# Patient Record
Sex: FEMALE | Race: BLACK OR AFRICAN AMERICAN | NOT HISPANIC OR LATINO | Employment: UNEMPLOYED | URBAN - METROPOLITAN AREA
[De-identification: names, ages, dates, MRNs, and addresses within clinical notes are randomized per-mention and may not be internally consistent; named-entity substitution may affect disease eponyms.]

---

## 2017-01-04 ENCOUNTER — TRANSCRIBE ORDERS (OUTPATIENT)
Dept: ADMINISTRATIVE | Facility: HOSPITAL | Age: 52
End: 2017-01-04

## 2017-01-04 DIAGNOSIS — R91.1 COIN LESION: Primary | ICD-10-CM

## 2017-01-12 ENCOUNTER — HOSPITAL ENCOUNTER (OUTPATIENT)
Dept: CT IMAGING | Facility: HOSPITAL | Age: 52
Discharge: HOME | End: 2017-01-12
Payer: COMMERCIAL

## 2017-01-12 DIAGNOSIS — R91.1 SOLITARY PULMONARY NODULE: ICD-10-CM

## 2017-01-12 PROCEDURE — 71250 CT THORAX DX C-: CPT

## 2017-01-13 ENCOUNTER — GENERIC CONVERSION - ENCOUNTER (OUTPATIENT)
Dept: OTHER | Facility: OTHER | Age: 52
End: 2017-01-13

## 2017-03-16 ENCOUNTER — ALLSCRIPTS OFFICE VISIT (OUTPATIENT)
Dept: OTHER | Facility: OTHER | Age: 52
End: 2017-03-16

## 2017-04-04 ENCOUNTER — ALLSCRIPTS OFFICE VISIT (OUTPATIENT)
Dept: OTHER | Facility: OTHER | Age: 52
End: 2017-04-04

## 2017-06-12 ENCOUNTER — ALLSCRIPTS OFFICE VISIT (OUTPATIENT)
Dept: OTHER | Facility: OTHER | Age: 52
End: 2017-06-12

## 2017-06-20 ENCOUNTER — ALLSCRIPTS OFFICE VISIT (OUTPATIENT)
Dept: OTHER | Facility: OTHER | Age: 52
End: 2017-06-20

## 2017-07-10 ENCOUNTER — ALLSCRIPTS OFFICE VISIT (OUTPATIENT)
Dept: OTHER | Facility: OTHER | Age: 52
End: 2017-07-10

## 2017-08-24 ENCOUNTER — ALLSCRIPTS OFFICE VISIT (OUTPATIENT)
Dept: OTHER | Facility: OTHER | Age: 52
End: 2017-08-24

## 2017-09-13 ENCOUNTER — ALLSCRIPTS OFFICE VISIT (OUTPATIENT)
Dept: OTHER | Facility: OTHER | Age: 52
End: 2017-09-13

## 2017-09-14 ENCOUNTER — GENERIC CONVERSION - ENCOUNTER (OUTPATIENT)
Dept: OTHER | Facility: OTHER | Age: 52
End: 2017-09-14

## 2017-09-27 ENCOUNTER — ALLSCRIPTS OFFICE VISIT (OUTPATIENT)
Dept: OTHER | Facility: OTHER | Age: 52
End: 2017-09-27

## 2017-10-02 ENCOUNTER — ALLSCRIPTS OFFICE VISIT (OUTPATIENT)
Dept: OTHER | Facility: OTHER | Age: 52
End: 2017-10-02

## 2017-10-02 DIAGNOSIS — F41.9 ANXIETY DISORDER: ICD-10-CM

## 2017-10-02 DIAGNOSIS — M79.7 FIBROMYALGIA: ICD-10-CM

## 2017-10-02 DIAGNOSIS — E55.9 VITAMIN D DEFICIENCY: ICD-10-CM

## 2017-10-02 DIAGNOSIS — F33.9 RECURRENT MAJOR DEPRESSIVE DISORDER (HCC): ICD-10-CM

## 2017-10-02 DIAGNOSIS — R53.83 OTHER FATIGUE: ICD-10-CM

## 2017-10-04 ENCOUNTER — LAB CONVERSION - ENCOUNTER (OUTPATIENT)
Dept: OTHER | Facility: OTHER | Age: 52
End: 2017-10-04

## 2017-10-04 ENCOUNTER — GENERIC CONVERSION - ENCOUNTER (OUTPATIENT)
Dept: OTHER | Facility: OTHER | Age: 52
End: 2017-10-04

## 2017-10-04 LAB
25(OH)D3 SERPL-MCNC: 19 NG/ML (ref 30–100)
A/G RATIO (HISTORICAL): 1.8 (CALC) (ref 1–2.5)
ALBUMIN SERPL BCP-MCNC: 4.4 G/DL (ref 3.6–5.1)
ALP SERPL-CCNC: 70 U/L (ref 40–115)
ALT SERPL W P-5'-P-CCNC: 11 U/L (ref 9–46)
AST SERPL W P-5'-P-CCNC: 14 U/L (ref 10–35)
BASOPHILS # BLD AUTO: 0.4 %
BASOPHILS # BLD AUTO: 19 CELLS/UL (ref 0–200)
BILIRUB SERPL-MCNC: 0.6 MG/DL (ref 0.2–1.2)
BUN SERPL-MCNC: 11 MG/DL (ref 7–25)
BUN/CREA RATIO (HISTORICAL): NORMAL (CALC) (ref 6–22)
CALCIUM SERPL-MCNC: 9.1 MG/DL (ref 8.6–10.3)
CHLORIDE SERPL-SCNC: 101 MMOL/L (ref 98–110)
CO2 SERPL-SCNC: 29 MMOL/L (ref 20–31)
CREAT SERPL-MCNC: 0.74 MG/DL (ref 0.7–1.33)
DEPRECATED RDW RBC AUTO: 12.7 % (ref 11–15)
EGFR AFRICAN AMERICAN (HISTORICAL): 125 ML/MIN/1.73M2
EGFR-AMERICAN CALC (HISTORICAL): 108 ML/MIN/1.73M2
EOSINOPHIL # BLD AUTO: 1 %
EOSINOPHIL # BLD AUTO: 48 CELLS/UL (ref 15–500)
GAMMA GLOBULIN (HISTORICAL): 2.4 G/DL (CALC) (ref 1.9–3.7)
GLUCOSE (HISTORICAL): 91 MG/DL (ref 65–99)
HCT VFR BLD AUTO: 35.4 % (ref 38.5–50)
HGB BLD-MCNC: 11.7 G/DL (ref 13.2–17.1)
LYMPHOCYTES # BLD AUTO: 1570 CELLS/UL (ref 850–3900)
LYMPHOCYTES # BLD AUTO: 32.7 %
MCH RBC QN AUTO: 27.2 PG (ref 27–33)
MCHC RBC AUTO-ENTMCNC: 33.1 G/DL (ref 32–36)
MCV RBC AUTO: 82.3 FL (ref 80–100)
MONOCYTES # BLD AUTO: 336 CELLS/UL (ref 200–950)
MONOCYTES (HISTORICAL): 7 %
NEUTROPHILS # BLD AUTO: 2827 CELLS/UL (ref 1500–7800)
NEUTROPHILS # BLD AUTO: 58.9 %
PLATELET # BLD AUTO: 272 THOUSAND/UL (ref 140–400)
PMV BLD AUTO: 9.4 FL (ref 7.5–12.5)
POTASSIUM SERPL-SCNC: 3.5 MMOL/L (ref 3.5–5.3)
RBC # BLD AUTO: 4.3 MILLION/UL (ref 4.2–5.8)
SODIUM SERPL-SCNC: 139 MMOL/L (ref 135–146)
TOTAL PROTEIN (HISTORICAL): 6.8 G/DL (ref 6.1–8.1)
TSH SERPL DL<=0.05 MIU/L-ACNC: 2.04 MIU/L (ref 0.4–4.5)
WBC # BLD AUTO: 4.8 THOUSAND/UL (ref 3.8–10.8)

## 2017-10-06 ENCOUNTER — GENERIC CONVERSION - ENCOUNTER (OUTPATIENT)
Dept: OTHER | Facility: OTHER | Age: 52
End: 2017-10-06
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
## 2017-11-01 ENCOUNTER — GENERIC CONVERSION - ENCOUNTER (OUTPATIENT)
Dept: OTHER | Facility: OTHER | Age: 52
End: 2017-11-01

## 2017-11-06 ENCOUNTER — ALLSCRIPTS OFFICE VISIT (OUTPATIENT)
Dept: OTHER | Facility: OTHER | Age: 52
End: 2017-11-06

## 2017-11-13 ENCOUNTER — ALLSCRIPTS OFFICE VISIT (OUTPATIENT)
Dept: OTHER | Facility: OTHER | Age: 52
End: 2017-11-13

## 2017-12-06 ENCOUNTER — GENERIC CONVERSION - ENCOUNTER (OUTPATIENT)
Dept: FAMILY MEDICINE CLINIC | Facility: CLINIC | Age: 52
End: 2017-12-06

## 2017-12-12 ENCOUNTER — ALLSCRIPTS OFFICE VISIT (OUTPATIENT)
Dept: OTHER | Facility: OTHER | Age: 52
End: 2017-12-12
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
## 2018-01-09 NOTE — PSYCH
Treatment Plan Tracking    #1 Treatment Plan not completed within required time limits due to: Client presented with emotional/behavioral issues that required clinical intervention            Signatures   Electronically signed by : REMIGIO Reyes,ROLAND; Aug 17 2016  8:43PM EST                       (Author)
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
## 2018-01-10 NOTE — PSYCH
Progress Note  Psychotherapy Provided [facility]: Individual Psychotherapy 45 mins  minutes provided today  Goals addressed in session:   (G# 1) states her plans to visit with her family in [place] on 10/16/17; "I am not engaging in any arguments or nonsense  "I have been keeping myself busy" with her babysitting her granddaughter;  has met with her  regarding her marital issues and had her mother accompany her to this appointment; "I have no problem expressing myself  My problems is the timing " She shared much information about her most recent interactions with her 29 yr old daughter and her mother with the identified focus being Munira beginning to establish clearer boundaries with her family including her no longer tolerating "drama" and disrespect  discussed her diligent efforts to adjust her expectations of herself in her relationships with her family; A: While presents as emotionally exhausted from her efforts to redefine her relationships with her family, she noted her feeling validated and as improving in her self-confidence; She expressed disappointment in her report of her mother's lack of response ("It has been two months ") to Munira's request to stay (at least) temporarily with her mother if she decides to leave her   P: (G#1 ) will continue to assert herself with her family;   Pain Scale and Suicide Risk [facility]: On a scale of 0 to 10, the patient rates current pain at 6   Current suicide risk is low   Behavioral Health Treatment Plan [address] [place] [address] [address]: Diagnosis and Treatment Plan explained to patient, patient relates understanding diagnosis and is agreeable to Treatment Plan  Results/Data  PHQ-9 Adult Depression Screening [id number] 01:33PM Marley Paul     Test Name Result Flag Reference   PHQ-9 Adult Depression Score 13     Over the last two weeks, how often have you been bothered by any of the following problems?   Little interest or pleasure in doing things: More than half the days - 2  Feeling down, depressed, or hopeless: Several days - 1  Trouble falling or staying asleep, or sleeping too much: More than half the days - 2  Feeling tired or having little energy: More than half the days - 2  Poor appetite or over eating: More than half the days - 2  Feeling bad about yourself - or that you are a failure or have let yourself or your family down: More than half the days - 2  Trouble concentrating on things, such as reading the newspaper or watching television: Several days - 1  Moving or speaking so slowly that other people could have noticed  Or the opposite -  being so fidgety or restless that you have been moving around a lot more than usual: Several days - 1  Thoughts that you would be better off dead, or of hurting yourself in some way: Not at all - 0   PHQ-9 Adult Depression Screening Positive     PHQ-9 Difficulty Level Not difficult at all     PHQ-9 Severity Moderate Depression         Assessment    1  Anxiety (300 00) (F41 9)   2  Major depressive disorder, recurrent episode, moderate (296 32) (F33 1)   3   Panic disorder without agoraphobia with mild panic attacks (300 01) (F41 0)    Signatures   Electronically signed by : Candy Hoffman, MSWLCSWMSAMADEO,ROLAND; Sep 27 2017  3:10PM EST                       (Author)
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
## 2018-01-10 NOTE — PSYCH
Message  Message Free Text Note Form: She left messagetoday at 7:51 AM to cancel her ind  psych  appt  for tonight at 7 PM reporting her 's truck broke down and she has no transportation to the appt  Active Problems    1  Allergic rhinitis (477 9) (J30 9)   2  Anxiety (300 00) (F41 9)   3  Chest pain (786 50) (R07 9)   4  Costochondritis (733 6) (M94 0)   5  Depression with anxiety (300 4) (F41 8)   6  Dry mouth (527 7) (R68 2)   7  Dyspnea (786 09) (R06 00)   8  Encounter for screening for malignant neoplasm of cervix (V76 2) (Z12 4)   9  Fatigue (780 79) (R53 83)   10  Fibromyalgia (729 1) (M79 7)   11  Flu vaccine need (V04 81) (Z23)   12  Insomnia (780 52) (G47 00)   13  Leg pain, right (729 5) (M79 604)   14  Major depressive disorder, recurrent episode, moderate (296 32) (F33 1)   15  Migraine headache (346 90) (G43 909)   16  Panic Disorder Without Agoraphobia   17  Vitamin D deficiency (268 9) (E55 9)    Current Meds   1  ClonazePAM 0 5 MG Oral Tablet; TAKE 1 TABLET TWICE DAILY; Therapy: 85HJG7734 to (Evaluate:23Oct2016); Last Rx:06Lnx7783 Ordered   2  Cyclobenzaprine HCl - 10 MG Oral Tablet; take 1 tablet daily as needed Recorded   3  Cymbalta 30 MG Oral Capsule Delayed Release Particles (DULoxetine HCl); TAKE ONE   (1) CAPSULE DAILY; Therapy: 66OMD1414 to Recorded   4  Cymbalta 60 MG Oral Capsule Delayed Release Particles (DULoxetine HCl); TAKE 1   CAPSULE DAILY; Therapy: (Recorded:99Nrb8746) to Recorded   5  Estradiol 2 MG Oral Tablet; qd Recorded   6  Fluticasone Propionate 50 MCG/ACT Nasal Suspension; USE 2 SPRAYS IN EACH   NOSTRIL ONCE DAILY; Therapy: 55FYO2914 to (Evaluate:32Bpz2801)  Requested for: 11Aug2014; Last   Rx:11Aug2014 Ordered   7  Gabapentin 100 MG Oral Capsule; TAKE 1 CAPSULE 3 TIMES DAILY  start with one tab at   bedtime for 5 days then 1 in am and 1 at bedtime for 5 days then 3 times   a day afterthat; Therapy: (Recorded:14Jun2016) to Recorded   8   LORazepam 1 MG Oral Tablet (Ativan); TAKE 1 TABLET 3 TIMES DAILY AS NEEDED; Therapy: 58MZP1861 to (Evaluate:05Ywi3904)  Requested for: 32QPX2811; Last   Rx:83Pqi2308 Ordered   9  Motrin 400 MG TABS (Ibuprofen); Therapy: (Recorded:40Lup4243) to Recorded   10  ProAir  (90 Base) MCG/ACT Inhalation Aerosol Solution; INHALE 2 PUFFS    EVERY 6 HOURS AS NEEDED; Therapy: 02Apr2015 to (Evaluate:16Apr2015)  Requested for: 02Apr2015; Last    Rx:02Apr2015 Ordered   11  ZyrTEC Allergy 10 MG Oral Tablet; take 1 tablet daily as needed Recorded    Allergies    1   No Known Drug Allergies    Signatures   Electronically signed by : Niki Laguna, MSWLCSWMSAMADEO,RASHIW; Nov 2 2016  9:38AM EST                       (Author)

## 2018-01-10 NOTE — RESULT NOTES
Verified Results  (1) COMPREHENSIVE METABOLIC PANEL 21UGV8347 96:00SE [employer]     Test Name Result Flag Reference   GLUCOSE 91 mg/dL  65-99   Fasting reference interval   UREA NITROGEN (BUN) 11 mg/dL  7-25   CREATININE 0 74 mg/dL  0 70-1 33   For patients >52years of age, the reference limit  for Creatinine is approximately 13% higher for people  identified as -American  eGFR NON-AFR   AMERICAN 108 mL/min/1 73m2  > OR = 60   eGFR AFRICAN AMERICAN 125 mL/min/1 73m2  > OR = 60   BUN/CREATININE RATIO   0-55   NOT APPLICABLE (calc)   SODIUM 139 mmol/L  135-146   POTASSIUM 3 5 mmol/L  3 5-5 3   CHLORIDE 101 mmol/L     CARBON DIOXIDE 29 mmol/L  20-31   CALCIUM 9 1 mg/dL  8 6-10 3   PROTEIN, TOTAL 6 8 g/dL  6 1-8 1   ALBUMIN 4 4 g/dL  3 6-5 1   GLOBULIN 2 4 g/dL (calc)  1 9-3 7   ALBUMIN/GLOBULIN RATIO 1 8 (calc)  1 0-2 5   BILIRUBIN, TOTAL 0 6 mg/dL  0 2-1 2   ALKALINE PHOSPHATASE 70 U/L     AST 14 U/L  10-35   ALT 11 U/L  9-46     (1) CBC/PLT/DIFF 52YLD2934 12:10PM [employer]     Test Name Result Flag Reference   WHITE BLOOD CELL COUNT 4 8 Thousand/uL  3 8-10 8   RED BLOOD CELL COUNT 4 30 Million/uL  4 20-5 80   HEMOGLOBIN 11 7 g/dL L 13 2-17 1   HEMATOCRIT 35 4 % L 38 5-50 0   MCV 82 3 fL  80 0-100 0   MCH 27 2 pg  27 0-33 0   MCHC 33 1 g/dL  32 0-36 0   RDW 12 7 %  11 0-15 0   PLATELET COUNT 342 Thousand/uL  140-400   ABSOLUTE NEUTROPHILS 2827 cells/uL  6586-5269   ABSOLUTE LYMPHOCYTES 1570 cells/uL  850-3900   ABSOLUTE MONOCYTES 336 cells/uL  200-950   ABSOLUTE EOSINOPHILS 48 cells/uL     ABSOLUTE BASOPHILS 19 cells/uL  0-200   NEUTROPHILS 58 9 %     LYMPHOCYTES 32 7 %     MONOCYTES 7 0 %     EOSINOPHILS 1 0 %     BASOPHILS 0 4 %     MPV 9 4 fL  7 5-12 5     *(Q) VITAMIN D, 25-HYDROXY, LC/MS/MS 67CXS2041 12:10PM [employer]     Test Name Result Flag Reference   VITAMIN D, 25-OH, TOTAL 19 ng/mL L    Vitamin D Status         25-OH Vitamin D:     Deficiency: <20 ng/mL  Insufficiency:             20 - 29 ng/mL  Optimal:                 > or = 30 ng/mL     For 25-OH Vitamin D testing on patients on   D2-supplementation and patients for whom quantitation   of D2 and D3 fractions is required, the QuestAssureD(TM)  25-OH VIT D, (D2,D3), LC/MS/MS is recommended: order   code 58551 (patients >2yrs)  For more information on this test, go to:  http://[employer]/faq/HSX042  (This link is being provided for   informational/educational purposes only )     (Q) TSH, 3RD GENERATION W/REFLEX TO FT4 95CKY6150 12:10PM Ellie Cotton   REPORT COMMENT:  FASTING:YES     Test Name Result Flag Reference   TSH W/REFLEX TO FT4 2 04 mIU/L  0 40-4 50

## 2018-01-11 NOTE — PSYCH
Message  Message Free Text Note Form: hao at 10: 36 AM today to cancel her 2 PM appt  for tomorrow reporting is still out of town; Active Problems    1  Allergic rhinitis (477 9) (J30 9)   2  Anxiety (300 00) (F41 9)   3  Costochondritis (733 6) (M94 0)   4  Dry mouth (527 7) (R68 2)   5  Encounter for screening for malignant neoplasm of cervix (V76 2) (Z12 4)   6  Fatigue (780 79) (R53 83)   7  Fibromyalgia (729 1) (M79 7)   8  Flu vaccine need (V04 81) (Z23)   9  Insomnia (780 52) (G47 00)   10  Major depressive disorder, recurrent (296 30) (F33 9)   11  Major depressive disorder, recurrent episode, moderate (296 32) (F33 1)   12  Migraine headache (346 90) (G43 909)   13  Panic disorder without agoraphobia with mild panic attacks (300 01) (F41 0)   14  Pulmonary nodule (793 11) (R91 1)   15  Sacroiliitis (720 2) (M46 1)   16  Sciatica of left side (724 3) (M54 32)   17  Vitamin D deficiency (268 9) (E55 9)    Current Meds   1  Acyclovir 800 MG Oral Tablet; TAKE 1 TABLET TWICE DAILY; Therapy: (Recorded:02Oct2017) to Recorded   2  BuPROPion HCl ER (XL) 150 MG Oral Tablet Extended Release 24 Hour; TAKE 1   TABLET DAILY; Therapy: 03TEB8130 to (Audrey Hanley)  Requested for: 74PTA5375; Last   Rx:02Oct2017 Ordered   3  ClonazePAM 0 5 MG Oral Tablet; Take 1 tablet twice daily as needed; Therapy: 11UGJ3409 to (Evaluate:01Nov2017); Last Rx:02Oct2017 Ordered   4  Colon Cleanse CAPS; take 1 capsule daily; Therapy: [phone]) to Recorded   5  Cyclobenzaprine HCl - 10 MG Oral Tablet; take 1 tablet daily as needed Recorded   6  Cymbalta 60 MG Oral Capsule Delayed Release Particles (DULoxetine HCl); TAKE 1   CAPSULE DAILY; Therapy: (Recorded:17Qer6268) to Recorded   7  Estradiol 2 MG Oral Tablet; qd Recorded   8  Fluticasone Propionate 50 MCG/ACT Nasal Suspension; USE 2 SPRAYS IN EACH   NOSTRIL ONCE DAILY; Therapy: 37NPL6007 to (Evaluate:89Pfw6540)  Requested for: 11Aug2014;  Last   Rx:11Aug2014 Ordered   9  Gabapentin 100 MG Oral Capsule; TAKE 1 CAPSULE in the morning, 1 capsule in the   afternoon, and 3 capsules at bedtime; Therapy: [phone]) to Recorded   10  Ibuprofen 200 MG Oral Tablet; TAKE 2 TABLETS 3 TIMES DAILY; Therapy: [phone]) to Recorded   11  Nabumetone 750 MG Oral Tablet; TAKE 1 TABLET TWICE DAILY AS NEEDED; Therapy: 68BLV4325 to (Evaluate:26Uov1884)  Requested for: 35Ahe6757; Last    Rx:05Xjn6448 Ordered   12  ProAir  (90 Base) MCG/ACT Inhalation Aerosol Solution; INHALE 2 PUFFS    EVERY 6 HOURS AS NEEDED; Therapy: 99Dti4268 to (Evaluate:76Uoo3858)  Requested for: 47Lve3316; Last    Rx:13Geq9547 Ordered   13  Vitamin D (Ergocalciferol) 87272 UNIT Oral Capsule; TAKE 1 CAPSULE WEEKLY; Therapy: 41DYN1844 to (Last Rx:79Ajn8625)  Requested for: 89Vdh0211 Ordered   14  Vitamin D 2000 UNIT Oral Capsule; TAKE 1 CAPSULE Daily after 12 weeks of 50,000    units; Therapy: 70DDO6925 to Recorded   15  ZyrTEC Allergy 10 MG Oral Tablet; take 1 tablet daily as needed Recorded    Allergies    1   No Known Drug Allergies    Signatures   Electronically signed by : MODESTA LoLCSWMODESTA,RASHIW; Nov 1 2017 11:25AM EST                       (Author)
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
## 2018-01-11 NOTE — PSYCH
Treatment Plan Tracking    #1 Treatment Plan not completed within required time limits due to: Other: did not schedule an appt  w ithin the 4 mon   time frame required to update a tx plan;           Signatures   Electronically signed by : REMIGIO Cruz,ROLAND; Mar 16 2017  3:50PM EST                       (Author)

## 2018-01-12 VITALS
BODY MASS INDEX: 25.83 KG/M2 | HEART RATE: 100 BPM | WEIGHT: 140.38 LBS | HEIGHT: 62 IN | SYSTOLIC BLOOD PRESSURE: 110 MMHG | DIASTOLIC BLOOD PRESSURE: 80 MMHG

## 2018-01-12 NOTE — PSYCH
Treatment Plan Tracking    #1 Treatment Plan not completed within required time limits due to: Other: at 5: 07 PM today left message to cancel her ind  psych  appt  for tonight at 7 PM; "too exhausted from moving;"            Signatures   Electronically signed by : Shreyas Schneider, ZULEMASWMODESTA,ROLAND; Oct  3 2016  5:48PM EST                       (Author)

## 2018-01-12 NOTE — PSYCH
Message  Message Free Text Note Form: requested a return phone call at which time she requested her clinical information to be released to her  whom she states is handling her Soc  Sec  Disability application; informed her the request for her medical records will need to be submitted to this practice by her  on her 's letterhead as well as her being required to sign a Release of Information (KYLE)  She committed to coming to the office on 8/28/17 with the address, etc  of her  for her to complete the KYLE; Active Problems    1  Allergic rhinitis (477 9) (J30 9)   2  Anxiety (300 00) (F41 9)   3  Costochondritis (733 6) (M94 0)   4  Dry mouth (527 7) (R68 2)   5  Encounter for screening for malignant neoplasm of cervix (V76 2) (Z12 4)   6  Fatigue (780 79) (R53 83)   7  Fibromyalgia (729 1) (M79 7)   8  Flu vaccine need (V04 81) (Z23)   9  Insomnia (780 52) (G47 00)   10  Major depressive disorder, recurrent episode, moderate (296 32) (F33 1)   11  Migraine headache (346 90) (G43 909)   12  Panic disorder without agoraphobia with mild panic attacks (300 01) (F41 0)   13  Pulmonary nodule (793 11) (R91 1)   14  Sacroiliitis (720 2) (M46 1)   15  Sciatica of left side (724 3) (M54 32)   16  Vitamin D deficiency (268 9) (E55 9)    Current Meds   1  ClonazePAM 0 5 MG Oral Tablet; Take 1 tablet twice daily as needed; Therapy: 56GJB5442 to (Evaluate:39Wyi1774); Last Rx:38Aqd9408 Ordered   2  Cyclobenzaprine HCl - 10 MG Oral Tablet; take 1 tablet daily as needed Recorded   3  Cymbalta 30 MG Oral Capsule Delayed Release Particles (DULoxetine HCl); TAKE ONE   (1) CAPSULE DAILY; Therapy: 09XNM7421 to Recorded   4  Cymbalta 60 MG Oral Capsule Delayed Release Particles (DULoxetine HCl); TAKE 1   CAPSULE DAILY; Therapy: (Recorded:45Kxr3978) to Recorded   5  Estradiol 2 MG Oral Tablet; qd Recorded   6   Fluticasone Propionate 50 MCG/ACT Nasal Suspension; USE 2 SPRAYS IN EACH   NOSTRIL ONCE DAILY; Therapy: 08ACM7765 to (Evaluate:52Ojz5518)  Requested for: 11Aug2014; Last   Rx:11Aug2014 Ordered   7  Gabapentin 100 MG Oral Capsule; TAKE 1 CAPSULE AT BEDTIME; Therapy: (Recorded:55Xdd4137) to Recorded   8  Nabumetone 750 MG Oral Tablet; TAKE 1 TABLET TWICE DAILY AS NEEDED; Therapy: 81CEG4888 to (Evaluate:48Ohy4869)  Requested for: 08Aug2017; Last   Rx:08Aug2017 Ordered   9  ProAir  (90 Base) MCG/ACT Inhalation Aerosol Solution; INHALE 2 PUFFS   EVERY 6 HOURS AS NEEDED; Therapy: 02Apr2015 to (Evaluate:16Apr2015)  Requested for: 02Apr2015; Last   Rx:02Apr2015 Ordered   10  ZyrTEC Allergy 10 MG Oral Tablet; take 1 tablet daily as needed Recorded    Allergies    1   No Known Drug Allergies    Signatures   Electronically signed by : Eleuterio Romeo, MODESTALCSWMSAMADEO,RASHIW; Aug 24 2017  5:13PM EST                       (Author)

## 2018-01-12 NOTE — PSYCH
Progress Note  Psychotherapy Provided [facility]: Individual Psychotherapy 45 mins  minutes provided today  Goals addressed in session:   (G# 1) "I have been depressed, a lot of crying  I have been sleeping in my grandchildren's bedroom " She clarified reportedly more recent conflicts with her current  and her feeling increasingly dissatisfied in the relationship  "I need to feel comfortable in my own home  I don't feel the love " She states has plans to visit with her sister in one of the Counts include [facility] beds at the [facility] during some time in September  discussed/reviewed the importance of her maintaining some self-care (ex , "I keep my distance from my  ") and exploring outlets for support; discussed the ongoing importance of effective communication when dealing with the reported conflicts between her and her ; A: presents as becoming increasingly dissatisfied in her marriage yet mindful of her having no income at this time; She confirmed she is applying for Soc  Sec  Disability  P: (G# 1) She will continue to use effective communication strategies and self-care;   Pain Scale and Suicide Risk [facility]: On a scale of 0 to 10, the patient rates current pain at 0   Current suicide risk is low   Behavioral Health Treatment Plan [facility]: Diagnosis and Treatment Plan explained to patient, patient relates understanding diagnosis and is agreeable to Treatment Plan  Assessment    1  Panic disorder without agoraphobia with mild panic attacks (300 01) (F41 0)   2   Major depressive disorder, recurrent episode, moderate (296 32) (F33 1)    Signatures   Electronically signed by : MODESTA MoodyLCSWMODESTA,ROLAND; Aug 24 2017  5:42PM EST                       (Author)

## 2018-01-13 NOTE — PSYCH
Progress Note  Psychotherapy Provided [facility]: Individual Psychotherapy 45 mins  minutes provided today  Goals addressed in session:   (G# 1 )"Things have been up and down   not as motivated   still going through things with my    attitude adjustments   " "His priorities are more important to him " "Because I do not work, if I know something (a bill) is due, I will wait to do something for myself  I will sacrifice  I don't ask for much " She shared multiple alleged experiences with plans to visit with family not coming to fruition  "I keep turning things down (opportunities to visit with her family who reside out of state)  " states has plans to visit with some family out of state from 6/22 -6/29; She elaborated on her tendency to "put others first and not do things for myself" which included several reported previous unsuccessful attempts to plan a family visit  She noted her two children and another relative purchased her round trip ticket to ensure she follows through with the visit  She expressed multiple concerns about her marriage (, Harjit Sinks, whom she reported had a previous marriage) and noted alleged efforts to have couples counseling with their  (She noted she is participating in "his" [facility] )  She described the experience as "biased" and expressed her impression that her  was unwilling to fully participate to accept his part of the responsibility for their marriage  She clarified another attempt to sit with him with her list of concerns (and to discuss his, also) which she stated did not prove to be productive   "What should I do?" discussed for preparation for the next session to further address her question that she consider listing what she is doing to effectively contribute to the marriage (handout) and what she feels her options are at this time; discussed self-care as integral to her quality of life including effective decision-making; A: presents as frustrated with her marriage; She has discussed the same concerns during previous sessions and her efforts to address her reported marital concerns  She described a key communication pattern as "he talks over me  I can never finish what I want to say " She added, "I get frustrated and yell back at him " P: (G# 1) will explore self-care strategies and complete the assignment noted during the previous portion of this documentation;   Pain Scale and Suicide Risk [facility]: On a scale of 0 to 10, the patient rates current pain at 0   Current suicide risk is low   Behavioral Health Treatment Plan [facility]: Diagnosis and Treatment Plan explained to patient, patient relates understanding diagnosis and is agreeable to Treatment Plan  Results/Data  PHQ-9 Adult Depression Screening 12Jun2017 12:19PM Tejinder Harden     Test Name Result Flag Reference   PHQ-9 Adult Depression Score 9     Over the last two weeks, how often have you been bothered by any of the following problems? Little interest or pleasure in doing things: Several days - 1  Feeling down, depressed, or hopeless: Several days - 1  Trouble falling or staying asleep, or sleeping too much: Nearly every day - 3  Feeling tired or having little energy: Several days - 1  Poor appetite or over eating: Several days - 1  Feeling bad about yourself - or that you are a failure or have let yourself or your family down: Several days - 1  Trouble concentrating on things, such as reading the newspaper or watching television: Several days - 1  Moving or speaking so slowly that other people could have noticed   Or the opposite -  being so fidgety or restless that you have been moving around a lot more than usual: Not at all - 0  Thoughts that you would be better off dead, or of hurting yourself in some way: Not at all - 0   PHQ-9 Adult Depression Screening Negative     PHQ-9 Difficulty Level Somewhat difficult     PHQ-9 Severity Mild Depression       PHQ-9 Adult Depression Screening [id number] 12:19PM Gissell Prater     Test Name Result Flag Reference   PHQ-9 Adult Depression Score 9     Over the last two weeks, how often have you been bothered by any of the following problems? Little interest or pleasure in doing things: Several days - 1  Feeling down, depressed, or hopeless: Several days - 1  Trouble falling or staying asleep, or sleeping too much: Nearly every day - 3  Feeling tired or having little energy: Several days - 1  Poor appetite or over eating: Several days - 1  Feeling bad about yourself - or that you are a failure or have let yourself or your family down: Several days - 1  Trouble concentrating on things, such as reading the newspaper or watching television: Several days - 1  Moving or speaking so slowly that other people could have noticed  Or the opposite -  being so fidgety or restless that you have been moving around a lot more than usual: Not at all - 0  Thoughts that you would be better off dead, or of hurting yourself in some way: Not at all - 0   PHQ-9 Adult Depression Screening Negative     PHQ-9 Difficulty Level Somewhat difficult     PHQ-9 Severity Mild Depression         Assessment    1  Anxiety (300 00) (F41 9)   2  Major depressive disorder, recurrent episode, moderate (296 32) (F33 1)   3   Panic disorder without agoraphobia with mild panic attacks (300 01) (F41 0)    Signatures   Electronically signed by : MODESTA LoLCSWMSAMADEO,ROLAND; Jun 12 2017  2:22PM EST                       (Author)
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
## 2018-01-14 VITALS
SYSTOLIC BLOOD PRESSURE: 92 MMHG | HEIGHT: 62 IN | HEART RATE: 88 BPM | RESPIRATION RATE: 14 BRPM | BODY MASS INDEX: 26.31 KG/M2 | DIASTOLIC BLOOD PRESSURE: 64 MMHG | WEIGHT: 143 LBS

## 2018-01-14 NOTE — PSYCH
Message  Message Free Text Note Form: was informed that earlier this morning, had lm with ans  service to cancel her ind  psych  appt  for today at [facility] with no reason provided; Active Problems    1  Allergic rhinitis (477 9) (J30 9)   2  Anxiety (300 00) (F41 9)   3  Costochondritis (733 6) (M94 0)   4  Dry mouth (527 7) (R68 2)   5  Encounter for screening for malignant neoplasm of cervix (V76 2) (Z12 4)   6  Fatigue (780 79) (R53 83)   7  Fibromyalgia (729 1) (M79 7)   8  Flu vaccine need (V04 81) (Z23)   9  Insomnia (780 52) (G47 00)   10  Major depressive disorder, recurrent (296 30) (F33 9)   11  Major depressive disorder, recurrent episode, moderate (296 32) (F33 1)   12  Migraine headache (346 90) (G43 909)   13  Panic disorder without agoraphobia with mild panic attacks (300 01) (F41 0)   14  Pulmonary nodule (793 11) (R91 1)   15  Sacroiliitis (720 2) (M46 1)   16  Sciatica of left side (724 3) (M54 32)   17  Vitamin D deficiency (268 9) (E55 9)    Current Meds   1  Acyclovir 800 MG Oral Tablet; TAKE 1 TABLET TWICE DAILY; Therapy: (Recorded:02Oct2017) to Recorded   2  BuPROPion HCl ER (XL) 150 MG Oral Tablet Extended Release 24 Hour; TAKE 1   TABLET DAILY; Therapy: 45VKW0401 to (Nick Matute)  Requested for: 66CZC5465; Last   Rx:02Oct2017 Ordered   3  ClonazePAM 0 5 MG Oral Tablet; Take 1 tablet twice daily as needed; Therapy: 47ANE8098 to (Evaluate:01Nov2017); Last Rx:02Oct2017 Ordered   4  Colon Cleanse CAPS; take 1 capsule daily; Therapy: [phone]) to Recorded   5  Cyclobenzaprine HCl - 10 MG Oral Tablet; take 1 tablet daily as needed Recorded   6  Cymbalta 60 MG Oral Capsule Delayed Release Particles (DULoxetine HCl); TAKE 1   CAPSULE DAILY; Therapy: (Recorded:62Rxs7097) to Recorded   7  Estradiol 2 MG Oral Tablet; qd Recorded   8  Fluticasone Propionate 50 MCG/ACT Nasal Suspension; USE 2 SPRAYS IN EACH   NOSTRIL ONCE DAILY;    Therapy: 92STR3513 to (Evaluate:01Qft3397) Requested for: 11Aug2014; Last   Rx:11Aug2014 Ordered   9  Gabapentin 100 MG Oral Capsule; TAKE 1 CAPSULE in the morning, 1 capsule in the   afternoon, and 3 capsules at bedtime; Therapy: ([phone]) to Recorded   10  Ibuprofen 200 MG Oral Tablet; TAKE 2 TABLETS 3 TIMES DAILY; Therapy: ([phone]) to Recorded   11  Nabumetone 750 MG Oral Tablet; TAKE 1 TABLET TWICE DAILY AS NEEDED; Therapy: 07QYM5354 to (Evaluate:70Erh2282)  Requested for: 08Aug2017; Last    Rx:65Shp7609 Ordered   12  ProAir  (90 Base) MCG/ACT Inhalation Aerosol Solution; INHALE 2 PUFFS    EVERY 6 HOURS AS NEEDED; Therapy: 02Apr2015 to (Evaluate:16Apr2015)  Requested for: 02Apr2015; Last    Rx:02Apr2015 Ordered   13  ZyrTEC Allergy 10 MG Oral Tablet; take 1 tablet daily as needed Recorded    Allergies    1   No Known Drug Allergies    Signatures   Electronically signed by : Sharon Burks, MSWLCSWMSAMADEO,ROLAND; Oct  4 2017 12:47PM EST                       (Author)

## 2018-01-14 NOTE — PSYCH
1  Anxiety (300 00) (F41 9)   2  Depression with anxiety (300 4) (F41 8)      Date of Initial Treatment Plan: 10/12/16  Date of Current Treatment Plan: 10/12/16  Treatment Plan 1  Strengths/Personal Resources for Self Care: "hard working; organized; compulsiveness tendencies; "sort of a perfectionist;" "I really care " "I love my immediate family (mother, siblings, , my children)  " structured ("I don't deviate from priorities"); Seymour Escobar Diagnosis:   Axis I: F41 8, F41 9   Axis II: deferred   Axis III: fibromyalgia; Area of Needs: anxiousness;  Long Term Goals:   #1 I want to improve with managing the anxiety  Target Date: 2/12/17              Short Term Objectives:   Goal 1:   A  I want to better understand my boundaries regarding for what I am/am not responsible regarding my grandson's well-being ("work on less worry/concern for daughter as a single parent")  B  I want to have realistic expectations of myself regarding for what I am/am not responsible  C  I want to be more mindful of the importance of my taking care of myself (ex , being assertive with my family regarding the limitations with the fibromyalgia)  D  I will be more mindful of using effective communication strategies  Target Date: 2/12/17              GOAL 1: Modality: Individual 2 x per month Target Date: 2/12/17       The person(s) responsible for carrying out the plan is Munira  The first scheduled review date is 2/12/17  The expected length of service is one year  Level of functioning at initial assessment: 65  The highest level of functioning in the past year was unknown  The current level of functioning is 65             Patient Signature: _________________________________ Date/Time: ______________       Electronically signed by : Doreen Du, MODESTALCSWMODESTA,ROLAND; Oct 12 2016 10:12AM EST                       (Author)

## 2018-01-15 ENCOUNTER — GENERIC CONVERSION - ENCOUNTER (OUTPATIENT)
Dept: OTHER | Facility: OTHER | Age: 53
End: 2018-01-15

## 2018-01-15 VITALS
TEMPERATURE: 99.4 F | RESPIRATION RATE: 12 BRPM | HEIGHT: 62 IN | DIASTOLIC BLOOD PRESSURE: 64 MMHG | BODY MASS INDEX: 27.23 KG/M2 | HEART RATE: 90 BPM | WEIGHT: 148 LBS | SYSTOLIC BLOOD PRESSURE: 90 MMHG

## 2018-01-15 NOTE — PSYCH
1  SUKUMAR (generalized anxiety disorder) (300 02) (F41 1)   2  Major depressive disorder, recurrent episode, moderate (296 32) (F33 1)      Date of Initial Treatment Plan: 10/12/16  Date of Current Treatment Plan: 11/13/17  Treatment Plan 4  Strengths/Personal Resources for Self Care: "hard working, organized, compulsiveness tendencies, "sor of a perfectionist;"   I really care  I love my immediate family (mother, siblings,  my children);" "structure ("I don't deviate from priorities "); Luis Mann Diagnosis:   Axis I: F41 8, F41 9   Axis II: deferred     Area of Needs: anxiousness;  Long Term Goals:   # 1 I want to continue to improve managing the anxiety  Target Date: 2/13/18      # 2 I want to continue to grow in wisdom concerning my choises (effective [id number] and learning from incorrect decisions)  Target Date: 2/13/182/13/18         Short Term Objectives:   Goal 1:   A  I want to continue to better understand my boundaries regarding my responsibilities with my grandson  B  I want to continue to have realistic expectations of myself regarding for what I am/am not responsible in my life ("avoiding unnecessary issues" and "having less stress")  C  I want to continue to be more mindful of the importance of my taking care of myself (ex , being assertive with my family regarding limitations with fibromyalgia)  D  I will continue to be more mindful of using effective communication strategies  Target Date: 2/13/18      Goal 2:   A  I will review my values  B  I want to review my affirmations  C  I will review my strengths/qualities/assets  D  I will understand that happiness is being true to one's self and know that, at times with one's uniqueness it may feel lonely temporarily (others do not determine y happiness, they enrich it)     Target Date: 2/13/18          GOAL 1: Modality: Individual 2 x per month Target Date: 2/13/18       The person(s) responsible for carrying out the plan is ANALI Rudolph kaci Fraser  GOAL 2: Modality: Individual 2 x per month Target Date: 2/13/18       The person(s) responsible for carrying out the plan is Tamiko  The first scheduled review date is 2/13/18  The expected length of service is one year  Level of functioning at initial assessment: 65  The highest level of functioning in the past year was 72  The current level of functioning is 67             Patient Signature: _________________________________ Date/Time: ______________       Electronically signed by : Eryn Woodson, MSWLCSWMSW,RASHIW; Nov 13 2017 10:00AM EST                       (Author)

## 2018-01-15 NOTE — PSYCH
Message  Message Free Text Note Form: per her request lm today returning her call; Active Problems    1  Allergic rhinitis (477 9) (J30 9)   2  Anxiety (300 00) (F41 9)   3  Chest pain (786 50) (R07 9)   4  Costochondritis (733 6) (M94 0)   5  Depression with anxiety (300 4) (F41 8)   6  Dry mouth (527 7) (R68 2)   7  Dyspnea (786 09) (R06 00)   8  Encounter for screening for malignant neoplasm of cervix (V76 2) (Z12 4)   9  Fatigue (780 79) (R53 83)   10  Fibromyalgia (729 1) (M79 7)   11  Flu vaccine need (V04 81) (Z23)   12  Insomnia (780 52) (G47 00)   13  Leg pain, right (729 5) (M79 604)   14  Major depressive disorder, recurrent episode, moderate (296 32) (F33 1)   15  Migraine headache (346 90) (G43 909)   16  Panic Disorder Without Agoraphobia   17  Vitamin D deficiency (268 9) (E55 9)    Current Meds   1  ClonazePAM 0 5 MG Oral Tablet; TAKE 1 TABLET TWICE DAILY; Therapy: 09GZP7069 to (Evaluate:41Boi0848); Last Rx:03Nov2016 Ordered   2  Cyclobenzaprine HCl - 10 MG Oral Tablet; take 1 tablet daily as needed Recorded   3  Cymbalta 30 MG Oral Capsule Delayed Release Particles (DULoxetine HCl); TAKE ONE   (1) CAPSULE DAILY; Therapy: 85JIQ2270 to Recorded   4  Cymbalta 60 MG Oral Capsule Delayed Release Particles (DULoxetine HCl); TAKE 1   CAPSULE DAILY; Therapy: (Recorded:35Ytl2129) to Recorded   5  Estradiol 2 MG Oral Tablet; qd Recorded   6  Fluticasone Propionate 50 MCG/ACT Nasal Suspension; USE 2 SPRAYS IN EACH   NOSTRIL ONCE DAILY; Therapy: 55PTE8828 to (Evaluate:44Yeb3157)  Requested for: 11Aug2014; Last   Rx:11Aug2014 Ordered   7  Gabapentin 100 MG Oral Capsule; TAKE 1 CAPSULE 3 TIMES DAILY  start with one tab at   bedtime for 5 days then 1 in am and 1 at bedtime for 5 days then 3 times   a day afterthat; Therapy: (Recorded:14Jun2016) to Recorded   8  LORazepam 1 MG Oral Tablet (Ativan); TAKE 1 TABLET 3 TIMES DAILY AS NEEDED;    Therapy: 78VXP7023 to (Evaluate:62Kej8150)  Requested for: 01FTQ2772; Last   Rx:78Lec4257 Ordered   9  Motrin 400 MG TABS (Ibuprofen); Therapy: (Recorded:56Xzj9794) to Recorded   10  ProAir  (90 Base) MCG/ACT Inhalation Aerosol Solution; INHALE 2 PUFFS    EVERY 6 HOURS AS NEEDED; Therapy: 02Apr2015 to (Evaluate:16Apr2015)  Requested for: 02Apr2015; Last    Rx:02Apr2015 Ordered   11  ZyrTEC Allergy 10 MG Oral Tablet; take 1 tablet daily as needed Recorded    Allergies    1   No Known Drug Allergies    Signatures   Electronically signed by : Jannette Ron, MSWLCSWMSAMADEO,RASHIW; Nov 10 2016  1:00PM EST                       (Author)

## 2018-01-15 NOTE — PSYCH
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 as a member of the [employer] or [employer]  Insurance Subscriber: Asher Springer   : 08/10/1954   Address: [address]   Employer: [employer]   Primary Insurance: [facility]   Phone number: [phone]   ID number: [id number]   Group number: [id number]         Presenting Problem (in patient's words): DEPRESSION AND ANXIETY  Substance Abuse: NO    Previous Treatment: The patient has not been seen here in the past      Accepted as Patient   [facility] 16      Primary Care Physician: DR Anders Garcia       Signatures   Electronically signed by : Mya Gray, ; 2016 10:49AM EST                       (Author)

## 2018-01-16 ENCOUNTER — GENERIC CONVERSION - ENCOUNTER (OUTPATIENT)
Dept: OTHER | Facility: OTHER | Age: 53
End: 2018-01-16

## 2018-01-16 NOTE — RESULT NOTES
Verified Results  * CT CHEST WO CONTRAST 12Jan2017 12:42PM Aisha Eisenmenger Order Number: [id number]    - Patient Instructions: To schedule this appointment, please contact Central Scheduling at [phone]  Test Name Result Flag Reference   CT CHEST WO CONTRAST (Report)     CT CHEST WITHOUT IV CONTRAST     INDICATION: 28-year-old woman with lung nodule  Follow-up  COMPARISON: CT chest from April 8, 2015  TECHNIQUE: CT examination of the chest was performed without intravenous contrast  Axial, sagittal and coronal reformatted images were submitted for interpretation  Coronal thick section MIP (maximal intensity projection) images were also created  This examination, like all CT scans performed in the [facility], was performed utilizing techniques to minimize radiation dose exposure, including the use of iterative reconstruction and automated exposure control  FINDINGS:     LUNGS: 5 mm triangular-shaped nodule in the superior segment of the right lower lobe, abutting the major fissure and unchanged since the last CT  Such perifissural nodules are most invariably benign, usually normal intrapulmonary lymph nodes  Lungs    otherwise clear  PLEURA: Small bilateral apical pleural plaques  No pleural effusions  HEART/GREAT VESSELS: Heart normal in size  Aorta and central pulmonary arteries normal in caliber  Pericardium normal in thickness  MEDIASTINUM AND FELICITAS: No lymphadenopathy or mass  Esophagus unremarkable  Trachea and mainstem bronchi normal      CHEST WALL AND LOWER NECK: Unremarkable  VISUALIZED STRUCTURES IN THE UPPER ABDOMEN: Unremarkable  OSSEOUS STRUCTURES: No acute fracture  No destructive osseous lesion  IMPRESSION:     A 5 mm nodule in the superior segment of the right lower lobe is unchanged since a CT from 4/8/2015 and is most likely a benign intrapulmonary lymph node         Workstation performed: [id number]     Signed by: Vasiliy Veronica MD   1/13/17
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
## 2018-01-16 NOTE — PSYCH
Message  Message Free Text Note Form: was informed she had left a message at 5:07 PM to cancel her ind  counseling appt  for tonight at 7 PM; "too exhausted from moving;"       Active Problems    1  Abdominal pain (789 00) (R10 9)   2  Abnormal chest xray (793 2) (R93 8)   3  Allergic rhinitis (477 9) (J30 9)   4  Anxiety (300 00) (F41 9)   5  Chest pain (786 50) (R07 9)   6  Costochondritis (733 6) (M94 0)   7  Depression with anxiety (300 4) (F41 8)   8  Dyspnea (786 09) (R06 00)   9  Encounter for screening for malignant neoplasm of cervix (V76 2) (Z12 4)   10  Fatigue (780 79) (R53 83)   11  Fibromyalgia (729 1) (M79 7)   12  Leg pain, right (729 5) (M79 604)   13  Major depressive disorder, recurrent episode, moderate (296 32) (F33 1)   14  Migraine headache (346 90) (G43 909)   15  Panic disorder (300 01) (F41 0)   16  Panic disorder without agoraphobia (300 01) (F41 0)   17  Panic Disorder Without Agoraphobia   18  Vitamin D deficiency (268 9) (E55 9)    Current Meds   1  Ambien 5 MG Oral Tablet (Zolpidem Tartrate); Therapy: (Recorded:68Ber5794) to Recorded   2  ClonazePAM 0 5 MG Oral Tablet; TAKE 1 TABLET TWICE DAILY; Therapy: 92TJU3306 to (Evaluate:23Oct2016); Last Rx:22Oks3295 Ordered   3  Cyclobenzaprine HCl - 10 MG Oral Tablet; take 1 tablet daily as needed Recorded   4  Cymbalta 30 MG Oral Capsule Delayed Release Particles (DULoxetine HCl); TAKE ONE   (1) CAPSULE DAILY; Therapy: 46JPF2893 to Recorded   5  Cymbalta 60 MG Oral Capsule Delayed Release Particles (DULoxetine HCl); TAKE 1   CAPSULE DAILY; Therapy: (Recorded:59Muj9711) to Recorded   6  Estradiol 2 MG Oral Tablet; qd Recorded   7  Fluticasone Propionate 50 MCG/ACT Nasal Suspension; USE 2 SPRAYS IN EACH   NOSTRIL ONCE DAILY; Therapy: 39IRF2592 to (Evaluate:21Xrm2618)  Requested for: 11Aug2014; Last   Rx:11Aug2014 Ordered   8  Gabapentin 100 MG Oral Capsule; TAKE 1 CAPSULE 3 TIMES DAILY   start with one tab at   bedtime for 5 days then 1 in am and 1 at bedtime for 5 days then 3 times   a day afterthat; Therapy: (Recorded:14Jun2016) to Recorded   9  LORazepam 1 MG Oral Tablet (Ativan); TAKE 1 TABLET 3 TIMES DAILY AS NEEDED; Therapy: 43KWK2289 to (Evaluate:25Bnx4094)  Requested for: 41CQP2223; Last   Rx:44Euv8233 Ordered   10  Motrin 400 MG TABS (Ibuprofen); Therapy: (Recorded:95Eie0837) to Recorded   11  ProAir  (90 Base) MCG/ACT Inhalation Aerosol Solution; INHALE 2 PUFFS    EVERY 6 HOURS AS NEEDED; Therapy: 02Apr2015 to (Evaluate:16Apr2015)  Requested for: 02Apr2015; Last    Rx:02Apr2015 Ordered   12  TraMADol HCl - 50 MG Oral Tablet; Take 1 tablet twice daily as needed Recorded   13  ZyrTEC Allergy 10 MG Oral Tablet; take 1 tablet daily as needed Recorded    Allergies    1   No Known Drug Allergies    Signatures   Electronically signed by : Callum Hoff, MSWLCSWMSAMADEO,RASHIW; Oct  3 2016  5:30PM EST                       (Author)
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
## 2018-01-17 NOTE — PSYCH
Message  Message Free Text Note Form: was informed that on 10/14/16 @ 4:52 PM she lm to cancel her ind  psych  for 10/17/16 @ 7 PM; no reason provided; Active Problems    1  Abdominal pain (789 00) (R10 9)   2  Abnormal chest xray (793 2) (R93 8)   3  Allergic rhinitis (477 9) (J30 9)   4  Anxiety (300 00) (F41 9)   5  Chest pain (786 50) (R07 9)   6  Costochondritis (733 6) (M94 0)   7  Depression with anxiety (300 4) (F41 8)   8  Dyspnea (786 09) (R06 00)   9  Encounter for screening for malignant neoplasm of cervix (V76 2) (Z12 4)   10  Fatigue (780 79) (R53 83)   11  Fibromyalgia (729 1) (M79 7)   12  Leg pain, right (729 5) (M79 604)   13  Major depressive disorder, recurrent episode, moderate (296 32) (F33 1)   14  Migraine headache (346 90) (G43 909)   15  Panic disorder (300 01) (F41 0)   16  Panic disorder without agoraphobia (300 01) (F41 0)   17  Panic Disorder Without Agoraphobia   18  Vitamin D deficiency (268 9) (E55 9)    Current Meds   1  Ambien 5 MG Oral Tablet (Zolpidem Tartrate); Therapy: (Recorded:87Edv6076) to Recorded   2  ClonazePAM 0 5 MG Oral Tablet; TAKE 1 TABLET TWICE DAILY; Therapy: 10OTQ9346 to (Evaluate:23Oct2016); Last Rx:11Isb5047 Ordered   3  Cyclobenzaprine HCl - 10 MG Oral Tablet; take 1 tablet daily as needed Recorded   4  Cymbalta 30 MG Oral Capsule Delayed Release Particles (DULoxetine HCl); TAKE ONE   (1) CAPSULE DAILY; Therapy: 06HYF8198 to Recorded   5  Cymbalta 60 MG Oral Capsule Delayed Release Particles (DULoxetine HCl); TAKE 1   CAPSULE DAILY; Therapy: (Recorded:80Osr5046) to Recorded   6  Estradiol 2 MG Oral Tablet; qd Recorded   7  Fluticasone Propionate 50 MCG/ACT Nasal Suspension; USE 2 SPRAYS IN EACH   NOSTRIL ONCE DAILY; Therapy: 38XGL6603 to (Evaluate:08Hxp5043)  Requested for: 11Aug2014; Last   Rx:11Aug2014 Ordered   8  Gabapentin 100 MG Oral Capsule; TAKE 1 CAPSULE 3 TIMES DAILY   start with one tab at   bedtime for 5 days then 1 in am and 1 at bedtime for 5 days then 3 times   a day afterthat; Therapy: (Recorded:14Jun2016) to Recorded   9  LORazepam 1 MG Oral Tablet (Ativan); TAKE 1 TABLET 3 TIMES DAILY AS NEEDED; Therapy: 19ORE5383 to (Evaluate:85Wdd1240)  Requested for: 01XHJ4329; Last   Rx:28Myo9360 Ordered   10  Motrin 400 MG TABS (Ibuprofen); Therapy: (Recorded:87Pod9262) to Recorded   11  ProAir  (90 Base) MCG/ACT Inhalation Aerosol Solution; INHALE 2 PUFFS    EVERY 6 HOURS AS NEEDED; Therapy: 02Apr2015 to (Evaluate:16Apr2015)  Requested for: 02Apr2015; Last    Rx:02Apr2015 Ordered   12  TraMADol HCl - 50 MG Oral Tablet; Take 1 tablet twice daily as needed Recorded   13  ZyrTEC Allergy 10 MG Oral Tablet; take 1 tablet daily as needed Recorded    Allergies    1   No Known Drug Allergies    Signatures   Electronically signed by : Jannette Ron, MSWLCSWMSAMADEO,RASHIW; Oct 17 2016  8:31AM EST                       (Author)

## 2018-01-17 NOTE — PSYCH
1  Anxiety (300 00) (F41 9)   2  Major depressive disorder, recurrent episode, moderate (296 32) (F33 1)      Date of Initial Treatment Plan: 10/12/16  Date of Current Treatment Plan: 7/10/17  Treatment Plan 3  Strengths/Personal Resources for Self Care: "hard working, organized,compulsiveness tendencies; "sort of a perfectionist;" "I really care, I love my immediate family (mother, siblings, , my children);" "structured ("I don't deviate from priorities "); Maida hPam Diagnosis:   Axis I: F41 8, F41 9   Axis II: deferred     Area of Needs: anxiousness  Long Term Goals:   # 1 I want to continue to improve in managing the anxiety  Target Date: 11/10/17      # 2 I want to continue to grow in wisdom concerning my choices (effective decision-making and learning from incorrect decisions)  Short Term Objectives:   Goal 1:   A  I want to continue to better understand my boundaries regarding my responsibilities with my grandson  B  I want to continue to have realistic expectations of myself regarding for what I am /am not responsible in my life ("avoiding unnecessary issues" and "having less stress")  C  I want to continue to be more mindful of the importance of my taking care of myself (ex , being assertive with my family regarding limitations with fibromyalgia)  D  I will continue to be more mindful of using effective communication strategies  Target Date: 11/10/17      Goal 2:   A  I will review my values  B  I want to review my affirmations  C  I will review my strengths/qualities/assets  D  I will understand that happiness is being true to one's self and know that, at times with one's uniqueness it may feel lonely temporarily (others do not determine my happiness, they enrich it)  GOAL 1: Modality: Individual 2 x per month Target Date: 11/10/17       The person(s) responsible for carrying out the plan is Beba Alvarenga     GOAL 2: Modality: Individual 2 x per month Target Date: 11/10/17       The person(s) responsible for carrying out the plan is Tamiko  The first scheduled review date is 11/10/17  The expected length of service is one year  Level of functioning at initial assessment: 65  The highest level of functioning in the past year was 72  The current level of functioning is 65             Patient Signature: _________________________________ Date/Time: ______________       Electronically signed by : Shreyas Schneider, MSWLCSWMSW,ROLAND; Jul 10 2017 12:35PM EST                       (Author)

## 2018-01-18 DIAGNOSIS — E55.9 VITAMIN D DEFICIENCY: ICD-10-CM

## 2018-01-18 NOTE — PSYCH
1  Anxiety (300 00) (F41 9)   2  Depression with anxiety (300 4) (F41 8)      Date of Initial Treatment Plan: 10/12/16  Date of Current Treatment Plan: 3/16/17  Treatment Plan 2  Strengths/Personal Resources for Self Care: "hard working;organized; compulsiveness tendencies;" "sort of a perfectionist;" "I really care  I love my immediate family (mother, siblings, , my children);" structured ("I don't deviate from priorities "); Case Hanna Diagnosis:   Axis I: F41 8, F41 9   Axis II: deferred     Area of Needs: anxiousness;  Long Term Goals:   # 1 I want to continue to improve in managing the anxiety  Target Date: 7/16/17              Short Term Objectives:   Goal 1:   A  I want to better understand my boundaries regarding for what I am /am not responsible regarding my grandson's well-being ("work on less worry/concern for daughter as a single parent")  B  I want to have realistic expectations of myself regarding for what I am /am not responsible  C  I want to be more mindful of the importance of my taking care of myself (ex , being assertive with my family regarding the limitation with the fibromyalgia)  D  I will be more mindful ofusing effective communication strategies  Target Date: 7/16/17              GOAL 1: Modality: Individual 2 x per month Target Date: 7/16/17       The person(s) responsible for carrying out the plan is Tamiko  The first scheduled review date is 7/16/17  The expected length of service is one year  Level of functioning at initial assessment: 65  The highest level of functioning in the past year was unknown  The current level of functioning is 65             Patient Signature: _________________________________ Date/Time: ______________       Electronically signed by : REMIGIO Schmid,ROLAND; Mar 16 2017  4:12PM EST                       (Author)

## 2018-01-18 NOTE — PSYCH
Message  Message Free Text Note Form: She lm today at 11 AM to cancel her ind psych  appt  for today at [facility] noting she has no gas in the car and that she is ill  Active Problems    1  Allergic rhinitis (477 9) (J30 9)   2  Anxiety (300 00) (F41 9)   3  Chest pain (786 50) (R07 9)   4  Costochondritis (733 6) (M94 0)   5  Depression with anxiety (300 4) (F41 8)   6  Dry mouth (527 7) (R68 2)   7  Dyspnea (786 09) (R06 00)   8  Encounter for screening for malignant neoplasm of cervix (V76 2) (Z12 4)   9  Fatigue (780 79) (R53 83)   10  Fibromyalgia (729 1) (M79 7)   11  Flu vaccine need (V04 81) (Z23)   12  Insomnia (780 52) (G47 00)   13  Leg pain, right (729 5) (M79 604)   14  Major depressive disorder, recurrent episode, moderate (296 32) (F33 1)   15  Migraine headache (346 90) (G43 909)   16  Panic Disorder Without Agoraphobia   17  Vitamin D deficiency (268 9) (E55 9)    Current Meds   1  ClonazePAM 0 5 MG Oral Tablet; TAKE 1 TABLET TWICE DAILY; Therapy: 81WYE5410 to (Evaluate:80Ecp7103); Last Rx:03Nov2016 Ordered   2  Cyclobenzaprine HCl - 10 MG Oral Tablet; take 1 tablet daily as needed Recorded   3  Cymbalta 30 MG Oral Capsule Delayed Release Particles (DULoxetine HCl); TAKE ONE   (1) CAPSULE DAILY; Therapy: 91CXE4356 to Recorded   4  Cymbalta 60 MG Oral Capsule Delayed Release Particles (DULoxetine HCl); TAKE 1   CAPSULE DAILY; Therapy: (Recorded:31Ocn8716) to Recorded   5  Estradiol 2 MG Oral Tablet; qd Recorded   6  Fluticasone Propionate 50 MCG/ACT Nasal Suspension; USE 2 SPRAYS IN EACH   NOSTRIL ONCE DAILY; Therapy: 52RLL9221 to (Evaluate:46Ogk4477)  Requested for: 11Aug2014; Last   Rx:11Aug2014 Ordered   7  Gabapentin 100 MG Oral Capsule; TAKE 1 CAPSULE 3 TIMES DAILY  start with one tab at   bedtime for 5 days then 1 in am and 1 at bedtime for 5 days then 3 times   a day afterthat; Therapy: (Recorded:28Aos3722) to Recorded   8   LORazepam 1 MG Oral Tablet (Ativan); TAKE 1 TABLET 3 TIMES DAILY AS NEEDED; Therapy: 02JSG6073 to (Evaluate:50Kdj1420)  Requested for: 84SWF0431; Last   Rx:04Tsv1460 Ordered   9  Motrin 400 MG TABS (Ibuprofen); Therapy: (Recorded:39Sbb5092) to Recorded   10  ProAir  (90 Base) MCG/ACT Inhalation Aerosol Solution; INHALE 2 PUFFS    EVERY 6 HOURS AS NEEDED; Therapy: 02Apr2015 to (Evaluate:16Apr2015)  Requested for: 02Apr2015; Last    Rx:32Cqf7895 Ordered   11  ZyrTEC Allergy 10 MG Oral Tablet; take 1 tablet daily as needed Recorded    Allergies    1   No Known Drug Allergies    Signatures   Electronically signed by : Ramonita Lira, ZULEMASWMODESTA,RASHIW; Nov 9 2016 12:45PM EST                       (Author)

## 2018-01-22 ENCOUNTER — ALLSCRIPTS OFFICE VISIT (OUTPATIENT)
Dept: OTHER | Facility: OTHER | Age: 53
End: 2018-01-22

## 2018-01-22 VITALS
HEART RATE: 79 BPM | BODY MASS INDEX: 23.16 KG/M2 | DIASTOLIC BLOOD PRESSURE: 76 MMHG | SYSTOLIC BLOOD PRESSURE: 118 MMHG | WEIGHT: 139 LBS | HEIGHT: 65 IN

## 2018-01-23 NOTE — MISCELLANEOUS
Message   Recorded as Task   Date: 01/15/2018 11:31 AM, Created By: Mary Jane Alexander   Task Name: Miscellaneous   Assigned To: Brigida Giron   Regarding Patient: Radha Sandoval, Status: Active   CommentDorien Karri - 15 Arian 2018 11:31 AM     TASK CREATED  Caller: Self; Other; [phone] (Home); [phone] (Work)  mahendra would like to continue services, but had to cancel her appointment for today due to insurance  she will callback to reschedule at a later time  mahendra asked if you can give her a call, she would like to tell you something personal   Spoke with mahendra - she wanted us to be aware that she has been going through "a lot of life changes"  She decided to divorce her  and is living now with her daughter  She has on and of depression and anxiety - has been only taking Cymbalta 60 mg per day as her insurance did not cover Cymbalta 30 mg 3 capsules per day (90 mg)  Advised to schedule a follow up appointment and I could try to renew her Cymbalta until the next visit for 60 mg and 30 mg (total of 90 mg) that is likely to be covered by insurance   Mahendra also wanted this info to be passed along to her therapist   Plan: will send information to therapist Bria [address] [place] [address] once she has a follow appointment pending      Signatures   Electronically signed by : AMADO Zamudio ; Arian 15 2018  1:27PM EST                       (Author)

## 2018-01-23 NOTE — PSYCH
Progress Note  Psychotherapy Provided [facility]: Individual Psychotherapy 45 mins  minutes provided today  Goals addressed in session:   (G#1 & 2 ) states has not been as anxious lately; states her grandson is recovering from pneumonia which she states he had contracted while she was visiting with out of state with her mother; She noted with her and her  not communicating well, she decided to stay at her mother's for a few days (since 12/3/17)  states during this time her  had gone out to drink and allegedly was charged with a DUI and "held by the police overnight;" She described their relationship during the past couple of years as "cordial " "I have to do what is best for me  He is not concerned about himself  He is not concerned about what he is putting other people through " "I am not going to keep arguing over things  He is an adult  I am not nagging him  I am trying to make use of common sense  I am going forth with making the changes now " states is using the [facility] website to file for divorce; "I don't remember the things I used to like to do, because I subjected my world around him as his wife  That was my choice  I will hold myself accountable to an extent " discussed the importance of her refocusing on her "self" and the importance of her own self-care and, thus, establishing her own direction in her life; A: will follow through with final steps to relocate locally to her mother's home as part of the separation process; She offered no misgivings or doubts about her decision to separate from her   P: (G# 2) will follow through on her steps to relocate to her mother's home locally as part of the separation process from her ; I will continue to assert myself with my mother  Pain Scale and Suicide Risk [facility]: On a scale of 0 to 10, the patient rates current pain at 5   Current suicide risk is low      Behavioral Health Treatment Plan [facility]: Diagnosis and Treatment Plan explained to patient, patient relates understanding diagnosis and is agreeable to Treatment Plan  Results/Data  PHQ-9 Adult Depression Screening 19Cmc2171 12:20PM Junior Rhoades     Test Name Result Flag Reference   PHQ-9 Adult Depression Score 22     Over the last two weeks, how often have you been bothered by any of the following problems? Little interest or pleasure in doing things: Nearly every day - 3  Feeling down, depressed, or hopeless: More than half the days - 2  Trouble falling or staying asleep, or sleeping too much: Nearly every day - 3  Feeling tired or having little energy: More than half the days - 2  Poor appetite or over eating: Nearly every day - 3  Feeling bad about yourself - or that you are a failure or have let yourself or your family down: Nearly every day - 3  Trouble concentrating on things, such as reading the newspaper or watching television: Nearly every day - 3  Moving or speaking so slowly that other people could have noticed  Or the opposite -  being so fidgety or restless that you have been moving around a lot more than usual: Nearly every day - 3  Thoughts that you would be better off dead, or of hurting yourself in some way: Not at all - 0   PHQ-9 Adult Depression Screening Positive     PHQ-9 Difficulty Level Very difficult     PHQ-9 Severity Severe Depression       PHQ-9 Adult Depression Screening 59Kml9947 12:20PM Junior Rhoades     Test Name Result Flag Reference   PHQ-9 Adult Depression Score 22     Over the last two weeks, how often have you been bothered by any of the following problems?   Little interest or pleasure in doing things: Nearly every day - 3  Feeling down, depressed, or hopeless: More than half the days - 2  Trouble falling or staying asleep, or sleeping too much: Nearly every day - 3  Feeling tired or having little energy: More than half the days - 2  Poor appetite or over eating: Nearly every day - 3  Feeling bad about yourself - or that you are a failure or have let yourself or your family down: Nearly every day - 3  Trouble concentrating on things, such as reading the newspaper or watching television: Nearly every day - 3  Moving or speaking so slowly that other people could have noticed  Or the opposite -  being so fidgety or restless that you have been moving around a lot more than usual: Nearly every day - 3  Thoughts that you would be better off dead, or of hurting yourself in some way: Not at all - 0   PHQ-9 Adult Depression Screening Positive     PHQ-9 Difficulty Level Very difficult     PHQ-9 Severity Severe Depression         Assessment    1  Anxiety (300 00) (F41 9)   2   Major depressive disorder, recurrent episode, moderate (296 32) (F33 1)    Signatures   Electronically signed by : Dylan Colbert, ZULEMASWMODESTA,ROLAND; Dec 12 2017  2:43PM EST                       (Author)

## 2018-01-23 NOTE — PSYCH
Message  Message Free Text Note Form: called her, at number provided, [phone], per her request and lm that I had returned her call; confirmed her next appt  as 1/22/18 @ Noon; Active Problems    1  Allergic rhinitis (477 9) (J30 9)   2  Anxiety (300 00) (F41 9)   3  Costochondritis (733 6) (M94 0)   4  Dry mouth (527 7) (R68 2)   5  Encounter for screening for malignant neoplasm of cervix (V76 2) (Z12 4)   6  Fatigue (780 79) (R53 83)   7  Fibromyalgia (729 1) (M79 7)   8  Flu vaccine need (V04 81) (Z23)   9  SUKUMAR (generalized anxiety disorder) (300 02) (F41 1)   10  Major depressive disorder, recurrent episode, moderate (296 32) (F33 1)   11  Menopause (627 2) (Z78 0)   12  Migraine headache (346 90) (G43 909)   13  Other insomnia (780 52) (G47 09)   14  Panic disorder without agoraphobia (300 01) (F41 0)   15  Post-traumatic stress disorder, chronic (309 81) (F43 12)   16  Pulmonary nodule (793 11) (R91 1)   17  Sacroiliitis (720 2) (M46 1)   18  Sciatica of left side (724 3) (M54 32)   19  Vitamin D deficiency (268 9) (E55 9)    Current Meds   1  Acyclovir 800 MG Oral Tablet; TAKE 1 TABLET TWICE DAILY; Therapy: (Recorded:02Oct2017) to Recorded   2  ClonazePAM 0 5 MG Oral Tablet; TAKE 1 TABLETS TWICE DAILY PRN; Therapy: 88TLD1146 to (USRDKSQK:19GAC1614); Last Rx:63Uif0979 Ordered   3  Colon Cleanse CAPS; take 1 capsule daily; Therapy: [phone]) to Recorded   4  Cyclobenzaprine HCl - 10 MG Oral Tablet; take 1 tablet daily as needed Recorded   5  Cymbalta 60 MG Oral Capsule Delayed Release Particles (DULoxetine HCl); TAKE 1   CAPSULE DAILY; Therapy: (Recorded:71Zsm0986) to Recorded   6  DULoxetine HCl - 30 MG Oral Capsule Delayed Release Particles; TAKE 3 CAPSULES   DAILY; Therapy: 30DYK6042 to (Evaluate:72Rpp1746)  Requested for: 80UIS4145; Last   Rx:06Nov2017 Ordered   7  Estradiol 2 MG Oral Tablet; qd Recorded   8   Estradiol 2 MG Oral Tablet; TAKE 1 TABLET DAILY AS DIRECTED; Therapy: 79OKG6068 to Recorded   9  Fluticasone Propionate 50 MCG/ACT Nasal Suspension; USE 2 SPRAYS IN EACH   NOSTRIL ONCE DAILY; Therapy: 92OQN7703 to (Evaluate:68Peh1394)  Requested for: 11Aug2014; Last   Rx:11Aug2014 Ordered   10  Gabapentin 100 MG Oral Capsule; TAKE 1 CAPSULE in the morning, 1 capsule in the    afternoon, and 3 capsules at bedtime; Therapy: (02 6732 5316) to Recorded   11  Ibuprofen 200 MG Oral Tablet; TAKE 2 TABLETS 3 TIMES DAILY; Therapy: ((02 6716 5792) to Recorded   12  ProAir  (90 Base) MCG/ACT Inhalation Aerosol Solution; INHALE 2 PUFFS    EVERY 6 HOURS AS NEEDED; Therapy: 02Apr2015 to (Evaluate:16Apr2015)  Requested for: 02Apr2015; Last    Rx:02Apr2015 Ordered   13  Vitamin D (Ergocalciferol) 88749 UNIT Oral Capsule; TAKE 1 CAPSULE WEEKLY; Therapy: 02VEI6136 to (Last Rx:12Oct2017)  Requested for: 90DOC2284; Status: ACTIVE    - Renewal Denied Ordered   14  Vitamin D 2000 UNIT Oral Capsule; TAKE 1 CAPSULE Daily after 12 weeks of 50,000    units; Therapy: 06DMV5129 to Recorded   15  ZyrTEC Allergy 10 MG Oral Tablet; take 1 tablet daily as needed Recorded    Allergies    1   No Known Drug Allergies    Signatures   Electronically signed by : ZULEMA ChanSWMODESTA,ROLAND; Jan 16 2018  5:45PM EST                       (Author)

## 2018-01-24 NOTE — PSYCH
Progress Note  Psychotherapy Provided [facility]: Individual Psychotherapy 45 mins  minutes provided today  Goals addressed in session:   (G#2 ) She provided background regarding her decision to leave her   "I want to enjoy life " states did ask her mother if she could temporarily stay with her; states she filed for divorce on 1/3/18 with a 90 day waiting period; "He is clueless regarding (her decision to file for divorce)  " states had been  to him for 12 years; "It has been a long hard (recent) few weeks " She clarified her impression of her mother's alleged behaviors in Munira's reported efforts to confirm if she could temporarily stay with her  She noted she is currently staying with her daughter  "Something is not right (with my mother)  Maybe someday I will know  She is not being honest with me  It hurts  I am (now) under a roof of peace  Things are going well there (at her daughter's) " [address], [place] [facility] [place] [facility] [address]; discussed/reviewed boundaries regarding her relationship with her mother; A: presents as trying to maintain her boundaries regarding her estranged  and her mother and her daughter; She presents as trying to remain issue-focused and not become involved in the emotions or "agendas" of others; P:(G#2) will continue to be mindful of boundaries and using assertiveness strategies and other effective communications strategies as needed;   Pain Scale and Suicide Risk [facility]: On a scale of 0 to 10, the patient rates current pain at 0   Current suicide risk is low   Behavioral Health Treatment Plan [facility]: Diagnosis and Treatment Plan explained to patient, patient relates understanding diagnosis and is agreeable to Treatment Plan            Results/Data  PHQ-9 Adult Depression Screening 54HKA9267 12:15PM Tejinder Harden     Test Name Result Flag Reference   PHQ-9 Adult Depression Score 16     Over the last two weeks, how often have you been bothered by any of the following problems? Little interest or pleasure in doing things: More than half the days - 2  Feeling down, depressed, or hopeless: More than half the days - 2  Trouble falling or staying asleep, or sleeping too much: More than half the days - 2  Feeling tired or having little energy: More than half the days - 2  Poor appetite or over eating: More than half the days - 2  Feeling bad about yourself - or that you are a failure or have let yourself or your family down: More than half the days - 2  Trouble concentrating on things, such as reading the newspaper or watching television: More than half the days - 2  Moving or speaking so slowly that other people could have noticed  Or the opposite -  being so fidgety or restless that you have been moving around a lot more than usual: More than half the days - 2  Thoughts that you would be better off dead, or of hurting yourself in some way: Not at all - 0   PHQ-9 Adult Depression Screening Positive     PHQ-9 Difficulty Level Very difficult     PHQ-9 Severity      Moderately Severe Depression     PHQ-9 Adult Depression Screening 26EUD2646 12:15PM Bart North     Test Name Result Flag Reference   PHQ-9 Adult Depression Score 16     Over the last two weeks, how often have you been bothered by any of the following problems?   Little interest or pleasure in doing things: More than half the days - 2  Feeling down, depressed, or hopeless: More than half the days - 2  Trouble falling or staying asleep, or sleeping too much: More than half the days - 2  Feeling tired or having little energy: More than half the days - 2  Poor appetite or over eating: More than half the days - 2  Feeling bad about yourself - or that you are a failure or have let yourself or your family down: More than half the days - 2  Trouble concentrating on things, such as reading the newspaper or watching television: More than half the days - 2  Moving or speaking so slowly that other people could have noticed  Or the opposite -  being so fidgety or restless that you have been moving around a lot more than usual: More than half the days - 2  Thoughts that you would be better off dead, or of hurting yourself in some way: Not at all - 0   PHQ-9 Adult Depression Screening Positive     PHQ-9 Difficulty Level Very difficult     PHQ-9 Severity      Moderately Severe Depression       Assessment    1   SUKUMAR (generalized anxiety disorder) (300 02) (F41 1)    Signatures   Electronically signed by : REMIGIO Cruz,ROLAND; Jan 22 2018  1:17PM EST                       (Author)

## 2018-02-09 PROBLEM — F41.1 GAD (GENERALIZED ANXIETY DISORDER): Status: ACTIVE | Noted: 2017-11-06

## 2018-02-09 PROBLEM — Z78.0 MENOPAUSE: Status: ACTIVE | Noted: 2017-11-06

## 2018-02-09 PROBLEM — G47.09 OTHER INSOMNIA: Status: ACTIVE | Noted: 2017-11-06

## 2018-02-09 PROBLEM — F41.0 PANIC DISORDER WITHOUT AGORAPHOBIA: Status: ACTIVE | Noted: 2017-11-06

## 2018-02-09 PROBLEM — F43.12 POST-TRAUMATIC STRESS DISORDER, CHRONIC: Status: ACTIVE | Noted: 2017-11-06

## 2018-02-09 PROBLEM — M46.1 SACROILIITIS (HCC): Status: ACTIVE | Noted: 2017-07-10

## 2018-02-09 RX ORDER — CLONAZEPAM 0.5 MG/1
TABLET ORAL
COMMUNITY
Start: 2016-06-14 | End: 2018-02-13 | Stop reason: SDUPTHER

## 2018-02-09 RX ORDER — DULOXETIN HYDROCHLORIDE 60 MG/1
90 CAPSULE, DELAYED RELEASE ORAL DAILY
COMMUNITY
End: 2018-02-13 | Stop reason: SDUPTHER

## 2018-02-09 RX ORDER — DULOXETIN HYDROCHLORIDE 30 MG/1
30 CAPSULE, DELAYED RELEASE ORAL DAILY
COMMUNITY
End: 2018-02-13 | Stop reason: ALTCHOICE
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
## 2018-02-13 ENCOUNTER — OFFICE VISIT (OUTPATIENT)
Dept: PSYCHIATRY | Facility: CLINIC | Age: 53
End: 2018-02-13

## 2018-02-13 VITALS
HEART RATE: 86 BPM | SYSTOLIC BLOOD PRESSURE: 114 MMHG | WEIGHT: 140 LBS | HEIGHT: 61 IN | DIASTOLIC BLOOD PRESSURE: 70 MMHG | BODY MASS INDEX: 26.43 KG/M2

## 2018-02-13 DIAGNOSIS — F43.12 POST-TRAUMATIC STRESS DISORDER, CHRONIC: ICD-10-CM

## 2018-02-13 DIAGNOSIS — F41.1 GAD (GENERALIZED ANXIETY DISORDER): ICD-10-CM

## 2018-02-13 DIAGNOSIS — G47.09 OTHER INSOMNIA: ICD-10-CM

## 2018-02-13 DIAGNOSIS — F33.2 MAJOR DEPRESSIVE DISORDER, RECURRENT, SEVERE WITHOUT PSYCHOTIC FEATURES (HCC): Primary | Chronic | ICD-10-CM

## 2018-02-13 DIAGNOSIS — F41.0 PANIC DISORDER WITHOUT AGORAPHOBIA: ICD-10-CM

## 2018-02-13 PROCEDURE — 99213 OFFICE O/P EST LOW 20 MIN: CPT | Performed by: PSYCHIATRY & NEUROLOGY

## 2018-02-13 RX ORDER — ACYCLOVIR 800 MG/1
1 TABLET ORAL 2 TIMES DAILY
COMMUNITY

## 2018-02-13 RX ORDER — TRAZODONE HYDROCHLORIDE 50 MG/1
50 TABLET ORAL
Qty: 30 TABLET | Refills: 3 | Status: SHIPPED | OUTPATIENT
Start: 2018-02-13 | End: 2018-06-01 | Stop reason: SDUPTHER

## 2018-02-13 RX ORDER — CLONAZEPAM 0.5 MG/1
0.5 TABLET ORAL 3 TIMES DAILY
Qty: 90 TABLET | Refills: 3 | Status: SHIPPED | OUTPATIENT
Start: 2018-02-13 | End: 2018-06-01 | Stop reason: SDUPTHER

## 2018-02-13 RX ORDER — GABAPENTIN 300 MG/1
300 CAPSULE ORAL 3 TIMES DAILY
COMMUNITY

## 2018-02-13 RX ORDER — DULOXETIN HYDROCHLORIDE 60 MG/1
120 CAPSULE, DELAYED RELEASE ORAL DAILY
Qty: 60 CAPSULE | Refills: 3 | Status: SHIPPED | OUTPATIENT
Start: 2018-02-13 | End: 2018-06-01 | Stop reason: SDUPTHER

## 2018-02-13 RX ORDER — FLUTICASONE PROPIONATE 50 MCG
2 SPRAY, SUSPENSION (ML) NASAL DAILY
COMMUNITY
Start: 2014-08-11 | End: 2018-05-21

## 2018-02-13 RX ORDER — CETIRIZINE HYDROCHLORIDE 10 MG/1
1 TABLET ORAL DAILY PRN
COMMUNITY
End: 2018-11-30 | Stop reason: ALTCHOICE

## 2018-02-14 ENCOUNTER — DOCUMENTATION (OUTPATIENT)
Dept: BEHAVIORAL/MENTAL HEALTH CLINIC | Facility: CLINIC | Age: 53
End: 2018-02-14

## 2018-02-15 NOTE — PROGRESS NOTES
Treatment Plan Tracking    # 1Treatment Plan not completed within required time limits due to: cancelled her counseling appt  for today due to reported illness

## 2018-03-20 ENCOUNTER — TELEPHONE (OUTPATIENT)
Dept: BEHAVIORAL/MENTAL HEALTH CLINIC | Facility: CLINIC | Age: 53
End: 2018-03-20

## 2018-03-28 ENCOUNTER — DOCUMENTATION (OUTPATIENT)
Dept: BEHAVIORAL/MENTAL HEALTH CLINIC | Facility: CLINIC | Age: 53
End: 2018-03-28

## 2018-03-28 ENCOUNTER — OFFICE VISIT (OUTPATIENT)
Dept: BEHAVIORAL/MENTAL HEALTH CLINIC | Facility: CLINIC | Age: 53
End: 2018-03-28
Payer: COMMERCIAL

## 2018-03-28 ENCOUNTER — TELEPHONE (OUTPATIENT)
Dept: BEHAVIORAL/MENTAL HEALTH CLINIC | Facility: CLINIC | Age: 53
End: 2018-03-28

## 2018-03-28 DIAGNOSIS — F41.1 GENERALIZED ANXIETY DISORDER: ICD-10-CM

## 2018-03-28 DIAGNOSIS — F43.12 CHRONIC POSTTRAUMATIC STRESS DISORDER: ICD-10-CM

## 2018-03-28 DIAGNOSIS — F33.2 MAJOR DEPRESSIVE DISORDER, RECURRENT, SEVERE WITHOUT PSYCHOTIC FEATURES (HCC): ICD-10-CM

## 2018-03-28 DIAGNOSIS — F41.0 PANIC DISORDER WITHOUT AGORAPHOBIA WITH MILD PANIC ATTACKS: Primary | ICD-10-CM

## 2018-03-28 PROCEDURE — 90834 PSYTX W PT 45 MINUTES: CPT | Performed by: SOCIAL WORKER
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
## 2018-03-28 NOTE — PROGRESS NOTES
Treatment Plan Tracking    # 1Treatment Plan not completed within required time limits due to: Client did not schedule a counseling appt within the four month time frame required to update a tx plan  Franci Trotter

## 2018-03-28 NOTE — TELEPHONE ENCOUNTER
Pt came in today stating that she is not feeling well on her meds at this point pts does not know which of the meds has her feeling sick but she wanted to let you if you can give pt a call when your back in the office

## 2018-03-28 NOTE — PROGRESS NOTES
Sarahtracie Gutierrez  1965       Date of Initial Treatment Plan: 10/12/16   Date of Current Treatment Plan: 03/28/18    Treatment Plan Number 5     Strengths/Personal Resources for Self Care: "hard working, organized, compulsiveness tendencies, "sort of a perfectionist;"    Diagnosis:   F43 12,F33 2, F41 1, F41 0    Area of Needs: anxiousness; panic attacks, flashbacks; Long Term Goal 1: I want to continue to effectively manage the anxiety  Target Date: 7/28/18  Completion Date: n/a         Short Term Objectives for Goal 1: AI want to continue to manage the boundaries with responsibilities with my grandson  B  I want to continue to have realistic expectations of myself regarding for what I am /am not responsible in my life ("avoiding unnecessary issue" and "having less stress")  C  I want to continue to be more mindful of the importance of my taking care of myself (ex , being assertive with my family regarding limitations with fibromyalgia)  D I will continue to be more mindful fo using effective communication strategies  E  I will begin to effectively manage the panic attacks ( will provide reading material)  Target Date; 7/28/18  Completion Date: n/a    Long Term Goal 2: I want to continue to grow in wisdom concerning my choices (effective decision-making and learning from incorrect decisions)  Target Date: 7/28/18  Completion Date: N/A    Short Term Objectives for Goal 2: AI will review my values  B I will review my affirmations  C  I will review my strengths/assets/qualities  D  I will understand that happiness is being true to one's self and know  That,at times, with one's uniqueness I may feel temporarily lonely      Target Date: 7/28/18  Completion Date: n/a              GOAL 1: Modality: Individual 1x per month   Completion Date n/a    GOAL 2: Modality: Individual 1x per month   Completion Date n/a       Behavioral Health Treatment Plan [facility]: Diagnosis and Treatment Plan explained to Debra Perez Munira relates understanding diagnosis and is agreeable to Treatment Plan  yes      Client Comments : Please share your thoughts, feelings, need and/or experiences regarding your treatment plan:       __________________________________________________________________    __________________________________________________________________    __________________________________________________________________    __________________________________________________________________    _______________________________________                Patient signature, Date Time: __________________________________________             Physician cosigner signature, Date, Time: ________________________________

## 2018-04-02 NOTE — TELEPHONE ENCOUNTER
Tried to call Alonso Hogan - she was not available  Message left on answering machine to call us back

## 2018-04-03 ENCOUNTER — TELEPHONE (OUTPATIENT)
Dept: PSYCHIATRY | Facility: CLINIC | Age: 53
End: 2018-04-03

## 2018-04-03 NOTE — TELEPHONE ENCOUNTER
Spoke with Narendra Field - she sometimes has upset stomach and headaches when taking Cymbalta  Advised to change Cymbalta to 60 mg bid instead of taking 120 mg at once  She will try that dosing

## 2018-04-25 LAB
25(OH)D3 SERPL-MCNC: 19 NG/ML (ref 30–100)
ALBUMIN SERPL-MCNC: 4.3 G/DL (ref 3.6–5.1)
ALBUMIN/GLOB SERPL: 1.6 (CALC) (ref 1–2.5)
ALP SERPL-CCNC: 65 U/L (ref 40–115)
ALT SERPL-CCNC: 12 U/L (ref 9–46)
ANA SER QL IF: NEGATIVE
APPEARANCE UR: CLEAR
AST SERPL-CCNC: 16 U/L (ref 10–35)
B BURGDOR AB SER IA-ACNC: <0.9 INDEX
BILIRUB SERPL-MCNC: 0.4 MG/DL (ref 0.2–1.2)
BILIRUB UR QL STRIP: NEGATIVE
BUN SERPL-MCNC: 11 MG/DL (ref 7–25)
BUN/CREAT SERPL: NORMAL (CALC) (ref 6–22)
CALCIUM SERPL-MCNC: 9.4 MG/DL (ref 8.6–10.3)
CCP IGG SERPL-ACNC: <16 UNITS
CHLORIDE SERPL-SCNC: 101 MMOL/L (ref 98–110)
CO2 SERPL-SCNC: 27 MMOL/L (ref 20–31)
COLOR UR: YELLOW
CREAT SERPL-MCNC: 0.79 MG/DL (ref 0.7–1.33)
CRP SERPL-MCNC: 2.5 MG/L
ERYTHROCYTE [DISTWIDTH] IN BLOOD BY AUTOMATED COUNT: 12.3 % (ref 11–15)
ERYTHROCYTE [SEDIMENTATION RATE] IN BLOOD BY WESTERGREN METHOD: 14 MM/H
GLOBULIN SER CALC-MCNC: 2.7 G/DL (CALC) (ref 1.9–3.7)
GLUCOSE SERPL-MCNC: 80 MG/DL (ref 65–99)
GLUCOSE UR QL STRIP: NEGATIVE
HBV SURFACE AG SERPL QL IA: NORMAL
HCT VFR BLD AUTO: 37.3 % (ref 38.5–50)
HCV AB S/CO SERPL IA: 0.85
HCV AB SERPL QL IA: NORMAL
HGB BLD-MCNC: 12.6 G/DL (ref 13.2–17.1)
HGB UR QL STRIP: NEGATIVE
HLA-B27 QL FC: NEGATIVE
KETONES UR QL STRIP: NEGATIVE
LEUKOCYTE ESTERASE UR QL STRIP: NEGATIVE
MCH RBC QN AUTO: 27.9 PG (ref 27–33)
MCHC RBC AUTO-ENTMCNC: 33.8 G/DL (ref 32–36)
MCV RBC AUTO: 82.5 FL (ref 80–100)
NITRITE UR QL STRIP: NEGATIVE
PH UR STRIP: 6.5 [PH] (ref 5–8)
PLATELET # BLD AUTO: 306 THOUSAND/UL (ref 140–400)
PMV BLD REES-ECKER: 9.1 FL (ref 7.5–12.5)
POTASSIUM SERPL-SCNC: 4 MMOL/L (ref 3.5–5.3)
PROT SERPL-MCNC: 7 G/DL (ref 6.1–8.1)
PROT UR QL STRIP: NEGATIVE
RBC # BLD AUTO: 4.52 MILLION/UL (ref 4.2–5.8)
RHEUMATOID FACT SERPL-ACNC: <14 IU/ML
SL AMB EGFR AFRICAN AMERICAN: 120 ML/MIN/1.73M2
SL AMB EGFR NON AFRICAN AMERICAN: 104 ML/MIN/1.73M2
SODIUM SERPL-SCNC: 137 MMOL/L (ref 135–146)
SP GR UR STRIP: 1.02 (ref 1–1.03)
WBC # BLD AUTO: 4.9 THOUSAND/UL (ref 3.8–10.8)

## 2018-04-30 ENCOUNTER — TELEPHONE (OUTPATIENT)
Dept: FAMILY MEDICINE CLINIC | Facility: CLINIC | Age: 53
End: 2018-04-30

## 2018-04-30 NOTE — TELEPHONE ENCOUNTER
Pt called stating she had a bunch of labs done, mostly ordered by her rheumatologist but her vitamin d was ordered by us and is low but she did not get any instructions as far as what she should do about it  Please advise

## 2018-04-30 NOTE — TELEPHONE ENCOUNTER
I did not get any notification that these labs were done  This is likely because it shows that Dr Kedar Forte the is listed as the ordering provider  Please have her clarify what dose of vitamin-D she is currently taking on a daily basis so that I can provide direction on what to increase it to

## 2018-05-11 NOTE — TELEPHONE ENCOUNTER
Called pt again  Pt could not remember off the top of her head what her dose is but will check and call back this afternoon

## 2018-05-11 NOTE — TELEPHONE ENCOUNTER
Called pt, to tell her to increase her vitamin D to 2000 unit, she would like to let you know that her rheumatologist call her and they told her that she needs to start taking Drisdol 50,000 unit once a week

## 2018-05-21 ENCOUNTER — OFFICE VISIT (OUTPATIENT)
Dept: FAMILY MEDICINE CLINIC | Facility: CLINIC | Age: 53
End: 2018-05-21
Payer: COMMERCIAL

## 2018-05-21 VITALS
HEART RATE: 76 BPM | BODY MASS INDEX: 26.41 KG/M2 | HEIGHT: 62 IN | WEIGHT: 143.5 LBS | OXYGEN SATURATION: 99 % | TEMPERATURE: 97.7 F | SYSTOLIC BLOOD PRESSURE: 102 MMHG | DIASTOLIC BLOOD PRESSURE: 70 MMHG

## 2018-05-21 DIAGNOSIS — F41.1 GENERALIZED ANXIETY DISORDER: ICD-10-CM

## 2018-05-21 DIAGNOSIS — G47.09 OTHER INSOMNIA: ICD-10-CM

## 2018-05-21 DIAGNOSIS — R00.2 HEART PALPITATIONS: Primary | ICD-10-CM

## 2018-05-21 DIAGNOSIS — J30.9 ALLERGIC RHINITIS, UNSPECIFIED SEASONALITY, UNSPECIFIED TRIGGER: ICD-10-CM

## 2018-05-21 PROCEDURE — 93000 ELECTROCARDIOGRAM COMPLETE: CPT | Performed by: FAMILY MEDICINE

## 2018-05-21 PROCEDURE — 99213 OFFICE O/P EST LOW 20 MIN: CPT | Performed by: FAMILY MEDICINE

## 2018-05-21 RX ORDER — MOMETASONE FUROATE 50 UG/1
2 SPRAY, METERED NASAL DAILY
Qty: 17 G | Refills: 3 | Status: SHIPPED | OUTPATIENT
Start: 2018-05-21 | End: 2018-05-22 | Stop reason: CLARIF

## 2018-05-21 RX ORDER — ERGOCALCIFEROL (VITAMIN D2) 1250 MCG
50000 CAPSULE ORAL WEEKLY
COMMUNITY
End: 2019-03-08 | Stop reason: ALTCHOICE

## 2018-05-21 NOTE — ASSESSMENT & PLAN NOTE
The patient appears to be experiencing a flare of her chronic anxiety  She does have multiple stressors going on currently  She was encouraged to use her clonazepam as needed  She usually takes it twice daily but we reviewed that she can and should use it a 3rd time if needed  She should not take it on a consistent scheduled basis though of 3 times daily  We reviewed that she should not be taking the clonazepam if it is not needed  She was encouraged to keep her upcoming follow ups with the therapist and psychiatrist in the next week or 2  We reviewed that possibly they could make some adjustments with her medication to help with her current levels of anxiety and insomnia

## 2018-05-21 NOTE — ASSESSMENT & PLAN NOTE
Currently on trazodone as needed for sleep  We reviewed that possibly her psychiatrist could make adjustment with this dosage  She will discuss with her psychiatrist at her upcoming appointment

## 2018-05-21 NOTE — ASSESSMENT & PLAN NOTE
I reviewed with the patient that headache is likely related to her allergies and some nasal congestion  She will continue with the Zyrtec at bedtime    She was given a script for Nasonex which she has found to be more helpful than Flonase in the past

## 2018-05-21 NOTE — PROGRESS NOTES
[place] [facility]  [facility]ILY PRACT[facility] ACUTE OFFICE VISIT       NAME: Cristian Salas  AGE: 47 y o  SEX: female       : 1965        MRN: [id number]    DATE: 2018  TIME: 6:51 PM    Assessment and Plan     Problem List Items Addressed This Visit     Allergic rhinitis       I reviewed with the patient that headache is likely related to her allergies and some nasal congestion  She will continue with the Zyrtec at bedtime  She was given a script for Nasonex which she has found to be more helpful than Flonase in the past          Relevant Medications    mometasone (NASONEX) 50 mcg/act nasal spray    Generalized anxiety disorder       The patient appears to be experiencing a flare of her chronic anxiety  She does have multiple stressors going on currently  She was encouraged to use her clonazepam as needed  She usually takes it twice daily but we reviewed that she can and should use it a 3rd time if needed  She should not take it on a consistent scheduled basis though of 3 times daily  We reviewed that she should not be taking the clonazepam if it is not needed  She was encouraged to keep her upcoming follow ups with the therapist and psychiatrist in the next week or 2  We reviewed that possibly they could make some adjustments with her medication to help with her current levels of anxiety and insomnia  Other insomnia       Currently on trazodone as needed for sleep  We reviewed that possibly her psychiatrist could make adjustment with this dosage  She will discuss with her psychiatrist at her upcoming appointment  Other Visit Diagnoses     Heart palpitations    -  Primary    We reviewed that her EKG today was normal   She does have any abnormalities noted  Her palpitations are likely a result of her increase in anxiety and stress      Relevant Orders    POCT ECG (Completed)          Generalized anxiety disorder    The patient appears to be experiencing a flare of her chronic anxiety  She does have multiple stressors going on currently  She was encouraged to use her clonazepam as needed  She usually takes it twice daily but we reviewed that she can and should use it a 3rd time if needed  She should not take it on a consistent scheduled basis though of 3 times daily  We reviewed that she should not be taking the clonazepam if it is not needed  She was encouraged to keep her upcoming follow ups with the therapist and psychiatrist in the next week or 2  We reviewed that possibly they could make some adjustments with her medication to help with her current levels of anxiety and insomnia  Allergic rhinitis    I reviewed with the patient that headache is likely related to her allergies and some nasal congestion  She will continue with the Zyrtec at bedtime  She was given a script for Nasonex which she has found to be more helpful than Flonase in the past     Other insomnia    Currently on trazodone as needed for sleep  We reviewed that possibly her psychiatrist could make adjustment with this dosage  She will discuss with her psychiatrist at her upcoming appointment  Chief Complaint     Chief Complaint   Patient presents with    Panic Attack    Headache     x 2 days        History of Present Illness   Manav Kelly Bonilla is a 47y o -year-old female who presents for anxiety/palpitations       Notes that she has been having racing in her chest - notes chest pressure - going through a divorce and living with her daughter but it is not going well - notes that she was with mom for a month and now with daughter for the last few months - having trouble with headache for days (not sure if sinus so taking Zyrtec and ES Tylenol - notes that she does not use Flonase often but Nasonex works great) - not able to sleep well even with the trazodone - notes that she has been feeling very anxious and stressed - notes that she sees the therapist about every 2 weeks - notes that she has next appointment at end of month and psych on 6/1/18           Review of Systems   Review of Systems   Constitutional: Negative for chills (but did a few weeks ago) and fever  HENT: Positive for congestion, ear pain (left for the last day or so), postnasal drip and sinus pressure  Negative for rhinorrhea and sore throat  Respiratory: Positive for cough (at night from PND), chest tightness and shortness of breath (occasional - usually if worked up - not with physical exertion)  Negative for wheezing  Cardiovascular: Positive for chest pain and palpitations  Gastrointestinal: Positive for nausea  Negative for diarrhea and vomiting  Neurological: Positive for headaches  Psychiatric/Behavioral: Positive for dysphoric mood and sleep disturbance  The patient is nervous/anxious  Active Problem List     Patient Active Problem List   Diagnosis    Allergic rhinitis    Costochondritis    Fibromyalgia    Generalized anxiety disorder    Major depressive disorder, recurrent, severe without psychotic features ([facility] [address] )    Menopause    Migraine headache    Other insomnia    Panic disorder without agoraphobia with mild panic attacks    Chronic posttraumatic stress disorder    Pulmonary nodule    Sacroiliitis (HCC)    Vitamin D deficiency         Social History:  Social History     Social History    Marital status: Legally      Spouse name: N/A    Number of children: 2    Years of education: Vocational school for Medical Assistant     Occupational History    Not on file       Social History Main Topics    Smoking status: Never Smoker    Smokeless tobacco: Never Used    Alcohol use Yes      Comment: Social alcohol use: 1 glass of wine on special occasions    Drug use: No    Sexual activity: Not Currently     Other Topics Concern    Not on file     Social History Narrative    Education: high school graduate and vocational school for Medical Assistant    Learning Disabilities: none Marital History:     Children: 1 adult son, 1 adult daughter    Living Arrangement: lives in home with adult daughter    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, currently unemployed, applied for disability    Functioning Relationships: poor relationship with     Legal History: none     History: None       Objective     Vitals:    05/21/18 1606   BP: 102/70   Pulse: 76   Temp: 97 7 °F (36 5 °C)   SpO2: 99%     Wt Readings from Last 3 Encounters:   05/21/18 65 1 kg (143 lb 8 oz)   11/06/17 63 kg (139 lb)   10/02/17 63 7 kg (140 lb 6 1 oz)       Physical Exam   Constitutional: She appears well-developed and well-nourished  No distress  HENT:   Head: Normocephalic and atraumatic  Right Ear: External ear normal    Left Ear: External ear normal    Nose: Mucosal edema (swelling without erythema or discharge) present  Right sinus exhibits no maxillary sinus tenderness and no frontal sinus tenderness  Left sinus exhibits maxillary sinus tenderness  Left sinus exhibits no frontal sinus tenderness  Mouth/Throat: Oropharynx is clear and moist    Neck: Neck supple  Cardiovascular: Normal rate, regular rhythm, normal heart sounds and intact distal pulses  No murmur heard  Pulmonary/Chest: Effort normal and breath sounds normal  She has no wheezes  She has no rales  Lymphadenopathy:     She has no cervical adenopathy  Skin: Skin is warm and dry  Psychiatric: She has a normal mood and affect  Her behavior is normal    Vitals reviewed        Pertinent Laboratory/Diagnostic Studies:  Results for orders placed or performed in visit on 04/23/18   Comprehensive metabolic panel   Result Value Ref Range    SL AMB GLUCOSE 80 65 - 99 mg/dL    BUN 11 7 - 25 mg/dL    Creatinine, Serum 0 79 0 70 - 1 33 mg/dL    eGFR Non  104 > OR = 60 mL/min/1 73m2    SL AMB EGFR  120 > OR = 60 mL/min/1 73m2    SL AMB BUN/CREATININE RATIO NOT APPLICABLE 6 - 22 (calc)    SL AMB SODIUM 137 135 - 146 mmol/L    SL AMB POTASSIUM 4 0 3 5 - 5 3 mmol/L    SL AMB CHLORIDE 101 98 - 110 mmol/L    SL AMB CARBON DIOXIDE 27 20 - 31 mmol/L    SL AMB CALCIUM 9 4 8 6 - 10 3 mg/dL    SL AMB PROTEIN, TOTAL 7 0 6 1 - 8 1 g/dL    Serum Albumin 4 3 3 6 - 5 1 g/dL    SL AMB GLOBULIN 2 7 1 9 - 3 7 g/dL (calc)    SL AMB ALBUMIN/GLOBULIN RATIO 1 6 1 0 - 2 5 (calc)    SL AMB BILIRUBIN, TOTAL 0 4 0 2 - 1 2 mg/dL    SL AMB ALKALINE PHOSPHATASE 65 40 - 115 U/L    SL AMB AST 16 10 - 35 U/L    SL AMB ALT 12 9 - 46 U/L   HLA-B27 antigen   Result Value Ref Range    HLA-B27 NEGATIVE NEGATIVE   Sedimentation rate, automated   Result Value Ref Range    SL AMB SED RATE BY MODIFIED WESTERGREN 14 < OR = 20 mm/h   CBC   Result Value Ref Range    SL AMB LAB WHITE BLOOD CELL COUNT 4 9 3 8 - 10 8 Thousand/uL    SL AMB LAB RED BLOOD CELLS 4 52 4 20 - 5 80 Million/uL    Hemoglobin 12 6 (L) 13 2 - 17 1 g/dL    Hematocrit 37 3 (L) 38 5 - 50 0 %    MCV 82 5 80 0 - 100 0 fL    MCH 27 9 27 0 - 33 0 pg    MCHC 33 8 32 0 - 36 0 g/dL    RDW 12 3 11 0 - 15 0 %    Platelet Count 947 410 - 400 Thousand/uL    SL AMB MPV 9 1 7 5 - 12 5 fL   Lyme Antibody Profile with reflex to WB   Result Value Ref Range    SL AMB LYME AB SCREEN <0 90 index   ROMY Screen w/ Reflex to Titer/Pattern   Result Value Ref Range    SL ROMY SCREEN, IFA NEGATIVE NEGATIVE   Cyclic citrul peptide antibody, IgG   Result Value Ref Range    Cyclic Citrullinated Peptide (CCP) Ab (IgG) <16 UNITS   Rheumatoid Factor   Result Value Ref Range    Rheumatoid Factor <14 <14 IU/mL   C-reactive protein   Result Value Ref Range    C-Reactive Protein, Quant 2 5 <8 0 mg/L   Hepatitis B surface antigen   Result Value Ref Range    HBsAg Screen NON-REACTIVE NON-REACTIVE   Hepatitis C Ab W/Refl To HCV RNA, Qn, PCR   Result Value Ref Range    SL AMB HEP C AB NON-REACTIVE NON-REACTIVE    Signal to Cut-Off 0 85 <1 00   Vitamin D 25 hydroxy   Result Value Ref Range    Vitamin D, 25-Hydroxy, Serum 19 (L) 30 - 100 ng/mL   Urinalysis with reflex to microscopic   Result Value Ref Range    SL AMB COLOR, URINE YELLOW YELLOW    Urine Appearance CLEAR CLEAR    Specific Gravity 1 021 1 001 - 1 035    Ph 6 5 5 0 - 8 0    SL AMB GLUCOSE, URINE, QUAL  NEGATIVE NEGATIVE    SL AMB BILIRUBIN, URINE NEGATIVE NEGATIVE    SL AMB KETONE, URINE, QUAL  NEGATIVE NEGATIVE    SL AMB BLOOD, URINE NEGATIVE NEGATIVE    SL AMB PROTEIN, URINE, QUAL  NEGATIVE NEGATIVE    SL AMB NITRITES URINE, QUAL   NEGATIVE NEGATIVE    SL AMB LEUKOCYTE ESTERASE URINE NEGATIVE NEGATIVE       Orders Placed This Encounter   Procedures    POCT ECG       ALLERGIES:  Allergies   Allergen Reactions    Dust Mite Extract     Pollen Extract     Tree Extract        Current Medications     Current Outpatient Prescriptions   Medication Sig Dispense Refill    acyclovir (ZOVIRAX) 800 mg tablet Take 1 tablet by mouth 2 (two) times a day      cetirizine (ZYRTEC ALLERGY) 10 mg tablet Take 1 tablet by mouth daily as needed      clonazePAM (KlonoPIN) 0 5 mg tablet Take 1 tablet (0 5 mg total) by mouth 3 (three) times a day for 120 days 90 tablet 3    DULoxetine (CYMBALTA) 60 mg delayed release capsule Take 2 capsules (120 mg total) by mouth daily for 120 days 60 capsule 3    ergocalciferol (ERGOCALCIFEROL) 69457 units capsule Take 50,000 Units by mouth once a week      estradiol (ESTRACE) 2 MG tablet Take 2 mg by mouth daily      gabapentin (NEURONTIN) 100 mg capsule Take 1 capsule by mouth 3 (three) times a day      ibuprofen (MOTRIN) 400 mg tablet Take 400 mg by mouth 3 (three) times a day      Misc Natural Products (COLON CLEANSE) CAPS Take 1 capsule by mouth daily      traZODone (DESYREL) 50 mg tablet Take 1 tablet (50 mg total) by mouth daily at bedtime as needed for sleep for up to 120 days 30 tablet 3    cyclobenzaprine (FLEXERIL) 10 mg tablet Take 10 mg by mouth 2 (two) times a day      mometasone (NASONEX) 50 mcg/act nasal spray 2 sprays into each nostril daily 17 g 3     No current facility-administered medications for this visit            Juanito Taylor PA-C  5/21/2018 6:51 PM  [facility]

## 2018-05-22 ENCOUNTER — TELEPHONE (OUTPATIENT)
Dept: FAMILY MEDICINE CLINIC | Facility: CLINIC | Age: 53
End: 2018-05-22

## 2018-05-22 DIAGNOSIS — J30.9 ALLERGIC RHINITIS, UNSPECIFIED SEASONALITY, UNSPECIFIED TRIGGER: Primary | ICD-10-CM

## 2018-05-22 RX ORDER — FLUNISOLIDE 0.25 MG/ML
2 SOLUTION NASAL EVERY 12 HOURS
Qty: 1 BOTTLE | Refills: 3 | Status: SHIPPED | OUTPATIENT
Start: 2018-05-22 | End: 2020-02-19 | Stop reason: ALTCHOICE

## 2018-05-22 NOTE — TELEPHONE ENCOUNTER
Pt called c/o having continued headaches and feeling pressure in her face  Pt was asking about rx because pharmacy called her to tell her we ordered something else  I explained the original nasal spray was not covered by insurance so we ordered a different one  Pt is concerned that is not going to help and that she really just needs abx  I explained to pt that we would like her to try the nasal spray and see if it helps and we will proceed from there  Pt states she is currently without a car because a family member needed to borrow it so she wont be able to get the spray until later

## 2018-05-22 NOTE — TELEPHONE ENCOUNTER
Mometasone Furoate 50MCG spray is not cover by the insurance, insurance will cover Flunisolide 0 025% spray   [facility] pharmacy [phone]

## 2018-05-23 DIAGNOSIS — F41.1 GAD (GENERALIZED ANXIETY DISORDER): ICD-10-CM

## 2018-05-23 DIAGNOSIS — F33.2 MAJOR DEPRESSIVE DISORDER, RECURRENT, SEVERE WITHOUT PSYCHOTIC FEATURES (HCC): Chronic | ICD-10-CM

## 2018-05-23 RX ORDER — DULOXETIN HYDROCHLORIDE 60 MG/1
120 CAPSULE, DELAYED RELEASE ORAL DAILY
Qty: 180 CAPSULE | Refills: 0 | OUTPATIENT
Start: 2018-05-23 | End: 2018-09-20

## 2018-05-24 ENCOUNTER — TELEPHONE (OUTPATIENT)
Dept: PSYCHIATRY | Facility: CLINIC | Age: 53
End: 2018-05-24

## 2018-05-24 NOTE — TELEPHONE ENCOUNTER
Spoke with [facility] pharmacy - they still had one 30 day refill left on Cymbalta Rx that was escripted on 2/18/18 for 30 days with 3 RF - Munira should have had enough Cymbalta until 6/13/18, and that was the reason why her Cymbalta Rx request yesterday was declined  [facility] pharmacist explained that she was dispensed 90 day supply on 2/18/18 (even Rx was written for 30 days), and computer requested new refill after 90 days  Message left on Munira's voice mail explaining that her Rx was declined since there should have been 1 RF left on her previous Rx

## 2018-05-24 NOTE — TELEPHONE ENCOUNTER
Pt called and stated pharmacy informed her that her rx of duloxetine was denied and she wasn't sure why  Pt sts she called her rheumotologist and asked them to fill it  Pt is scheduled w/ you next Friday, but pt states she only has 6 pills left, and takes 2 a day  Pt wanted to speak to you about this

## 2018-05-28 PROBLEM — G47.09 OTHER INSOMNIA: Chronic | Status: ACTIVE | Noted: 2017-11-06

## 2018-05-28 PROBLEM — F43.12 CHRONIC POSTTRAUMATIC STRESS DISORDER: Chronic | Status: ACTIVE | Noted: 2017-11-06

## 2018-05-28 PROBLEM — F41.1 GENERALIZED ANXIETY DISORDER: Chronic | Status: ACTIVE | Noted: 2017-11-06

## 2018-05-28 PROBLEM — F41.0 PANIC DISORDER WITHOUT AGORAPHOBIA WITH MILD PANIC ATTACKS: Chronic | Status: ACTIVE | Noted: 2017-11-06
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
## 2018-05-31 ENCOUNTER — TELEPHONE (OUTPATIENT)
Dept: BEHAVIORAL/MENTAL HEALTH CLINIC | Facility: CLINIC | Age: 53
End: 2018-05-31

## 2018-06-01 ENCOUNTER — OFFICE VISIT (OUTPATIENT)
Dept: PSYCHIATRY | Facility: CLINIC | Age: 53
End: 2018-06-01
Payer: COMMERCIAL

## 2018-06-01 VITALS
DIASTOLIC BLOOD PRESSURE: 69 MMHG | HEART RATE: 93 BPM | BODY MASS INDEX: 26.62 KG/M2 | SYSTOLIC BLOOD PRESSURE: 105 MMHG | HEIGHT: 61 IN | WEIGHT: 141 LBS

## 2018-06-01 DIAGNOSIS — F41.1 GAD (GENERALIZED ANXIETY DISORDER): ICD-10-CM

## 2018-06-01 DIAGNOSIS — F41.0 PANIC DISORDER WITHOUT AGORAPHOBIA: Chronic | ICD-10-CM

## 2018-06-01 DIAGNOSIS — F33.2 MAJOR DEPRESSIVE DISORDER, RECURRENT, SEVERE WITHOUT PSYCHOTIC FEATURES (HCC): Primary | Chronic | ICD-10-CM

## 2018-06-01 DIAGNOSIS — G47.09 OTHER INSOMNIA: Chronic | ICD-10-CM

## 2018-06-01 DIAGNOSIS — F41.1 GENERALIZED ANXIETY DISORDER: Chronic | ICD-10-CM

## 2018-06-01 DIAGNOSIS — F43.12 POST-TRAUMATIC STRESS DISORDER, CHRONIC: Chronic | ICD-10-CM

## 2018-06-01 PROCEDURE — 90833 PSYTX W PT W E/M 30 MIN: CPT | Performed by: PSYCHIATRY & NEUROLOGY

## 2018-06-01 PROCEDURE — 99213 OFFICE O/P EST LOW 20 MIN: CPT | Performed by: PSYCHIATRY & NEUROLOGY

## 2018-06-01 RX ORDER — TRAZODONE HYDROCHLORIDE 50 MG/1
50 TABLET ORAL
Qty: 30 TABLET | Refills: 3 | Status: SHIPPED | OUTPATIENT
Start: 2018-06-01 | End: 2018-08-16 | Stop reason: SDUPTHER

## 2018-06-01 RX ORDER — ARIPIPRAZOLE 2 MG/1
2 TABLET ORAL
Qty: 30 TABLET | Refills: 3 | Status: SHIPPED | OUTPATIENT
Start: 2018-06-01 | End: 2018-08-16 | Stop reason: SDUPTHER

## 2018-06-01 RX ORDER — CLONAZEPAM 0.5 MG/1
0.5 TABLET ORAL 3 TIMES DAILY
Qty: 90 TABLET | Refills: 3 | Status: SHIPPED | OUTPATIENT
Start: 2018-06-13 | End: 2018-11-30 | Stop reason: SDUPTHER

## 2018-06-01 RX ORDER — DULOXETIN HYDROCHLORIDE 60 MG/1
120 CAPSULE, DELAYED RELEASE ORAL DAILY
Qty: 60 CAPSULE | Refills: 3 | Status: SHIPPED | OUTPATIENT
Start: 2018-06-01 | End: 2018-08-16 | Stop reason: SDUPTHER

## 2018-06-04 ENCOUNTER — TELEPHONE (OUTPATIENT)
Dept: BEHAVIORAL/MENTAL HEALTH CLINIC | Facility: CLINIC | Age: 53
End: 2018-06-04

## 2018-06-04 NOTE — TELEPHONE ENCOUNTER
Patient called wanting to let you know that she does want to do the [facility], but will be gong to [place] this Saturday for 3 weeks  She says if you feel she should really go into it she can change her plans  If you'd like to call her you may

## 2018-06-05 ENCOUNTER — TELEPHONE (OUTPATIENT)
Dept: PSYCHIATRY | Facility: CLINIC | Age: 53
End: 2018-06-05

## 2018-06-05 ENCOUNTER — DOCUMENTATION (OUTPATIENT)
Dept: BEHAVIORAL/MENTAL HEALTH CLINIC | Facility: CLINIC | Age: 53
End: 2018-06-05

## 2018-06-05 NOTE — TELEPHONE ENCOUNTER
Pharmacist called to say that the last time Clonazepam 0 5 mg was filled the patient was only given 10-day supply  Do you still want pharmacy to hold off on filling prescription until 6/13?

## 2018-06-05 NOTE — TELEPHONE ENCOUNTER
Spoke with pharmacy - Munira was getting 10 day Klonopin supply because she was paying out of pocket due to insurance issues   She will get another 10 day supply now for previous Rx written on 2/15/18 and she will be due for her next Rx on 6/13/18

## 2018-06-06 ENCOUNTER — TELEPHONE (OUTPATIENT)
Dept: BEHAVIORAL/MENTAL HEALTH CLINIC | Facility: CLINIC | Age: 53
End: 2018-06-06

## 2018-06-07 ENCOUNTER — OFFICE VISIT (OUTPATIENT)
Dept: BEHAVIORAL/MENTAL HEALTH CLINIC | Facility: CLINIC | Age: 53
End: 2018-06-07
Payer: COMMERCIAL

## 2018-06-07 ENCOUNTER — DOCUMENTATION (OUTPATIENT)
Dept: BEHAVIORAL/MENTAL HEALTH CLINIC | Facility: CLINIC | Age: 53
End: 2018-06-07

## 2018-06-07 DIAGNOSIS — F41.0 PANIC DISORDER WITHOUT AGORAPHOBIA: ICD-10-CM

## 2018-06-07 DIAGNOSIS — F33.2 SEVERE EPISODE OF RECURRENT MAJOR DEPRESSIVE DISORDER, WITHOUT PSYCHOTIC FEATURES (HCC): Primary | ICD-10-CM

## 2018-06-07 DIAGNOSIS — F43.12 PROLONGED POSTTRAUMATIC STRESS DISORDER: ICD-10-CM

## 2018-06-07 PROCEDURE — 90834 PSYTX W PT 45 MINUTES: CPT | Performed by: SOCIAL WORKER

## 2018-06-07 NOTE — PSYCH
Psychotherapy Provided: Individual Psychotherapy 45 minutes "I am tired of the roller coaster (ongoing conflicts with her daughter which have escalated since Munira moved in with her daughter having left her  with divorce proceedings) " She notes things have "calmed down" since her phone call to this writer (her therapist) last week at which time presented as distraught,anxious and crying  States the Memorial day holiday is difficult for her as she notes her biological father passed away twenty years ago; "I try to keep myself busy with positive things " She reiterated her interest in participating in the [facility] ([facility])  She reiterated her concern for her grandson who lives with [facility] and Munira's daughter (the mother to her grandson)  Discussed/reviewed boundaries regarding her relationship with her daughter; discussed/reviewed self-care; A: presents as trying to maintain boundaries with her daughter as well as focusing on her own self-care; P:(G#  1) will continue to maintain boundaries as a strategy for her to more effectively manage the anxiety; Length of time in session: 45 minutes, follow up in 2 week    Goals addressed in session: Goal 1     Pain:      moderate to severe    8    Current suicide risk : [address]: Diagnosis and Treatment Plan explained to Jose Sanchez relates understanding diagnosis and is agreeable to Treatment Plan   Yes

## 2018-06-08 ENCOUNTER — TELEPHONE (OUTPATIENT)
Dept: PSYCHOLOGY | Facility: CLINIC | Age: 53
End: 2018-06-08

## 2018-06-08 ENCOUNTER — TELEPHONE (OUTPATIENT)
Dept: FAMILY MEDICINE CLINIC | Facility: CLINIC | Age: 53
End: 2018-06-08

## 2018-06-08 NOTE — TELEPHONE ENCOUNTER
Patient call today around 12:18 pm to cancel her appointment c/o severe pain , not able to drive  I did call patient to f/u,  she apologize for canceling her appointment for the second time,  she would like to know if you can call her

## 2018-06-08 NOTE — TELEPHONE ENCOUNTER
[facility] Intake Assessment     Presenting Stressors: Patient having conflicts with daughter Patient is highly distraught crying she states everything is always her fault She reports poor sleep loss of appetite difficulty concentrating low self esteem irritable feeling nervous and excessive worry  Referral Source: Malachi Whitfield   she is employed at No  Access to weapons? No  Is she a smoker?  no    Symptoms: depressed mood, anxiety and loss of appetite low self esteem irritalbe difficulty relaxing         Current Outpatient Prescriptions:     acyclovir (ZOVIRAX) 800 mg tablet, Take 1 tablet by mouth 2 (two) times a day, Disp: , Rfl:     ARIPiprazole (ABILIFY) 2 mg tablet, Take 1 tablet (2 mg total) by mouth daily at bedtime for 120 days, Disp: 30 tablet, Rfl: 3    cetirizine (ZYRTEC ALLERGY) 10 mg tablet, Take 1 tablet by mouth daily as needed, Disp: , Rfl:     [START ON 6/13/2018] clonazePAM (KlonoPIN) 0 5 mg tablet, Take 1 tablet (0 5 mg total) by mouth 3 (three) times a day for 120 days To be filled on or after 6/13/18, Disp: 90 tablet, Rfl: 3    DULoxetine (CYMBALTA) 60 mg delayed release capsule, Take 2 capsules (120 mg total) by mouth daily for 120 days, Disp: 60 capsule, Rfl: 3    ergocalciferol (ERGOCALCIFEROL) 75467 units capsule, Take 50,000 Units by mouth once a week, Disp: , Rfl:     estradiol (ESTRACE) 2 MG tablet, Take 2 mg by mouth daily, Disp: , Rfl:     flunisolide (NASALIDE) 25 MCG/ACT (0 025%) SOLN, Inhale 2 sprays every 12 (twelve) hours, Disp: 1 Bottle, Rfl: 3    gabapentin (NEURONTIN) 100 mg capsule, Take 1 capsule by mouth 3 (three) times a day, Disp: , Rfl:     ibuprofen (MOTRIN) 400 mg tablet, Take 400 mg by mouth 3 (three) times a day, Disp: , Rfl:     Misc Natural Products (COLON CLEANSE) CAPS, Take 1 capsule by mouth daily, Disp: , Rfl:     traZODone (DESYREL) 50 mg tablet, Take 1 tablet (50 mg total) by mouth daily at bedtime as needed for sleep for up to 120 days, Disp: 30 tablet, Rfl: 3    Medications: See Above    Allergies   Allergen Reactions    Dust Mite Extract     Pollen Extract     Tree Extract        Allergies: See Above     Provisional Diagnosis:   Axis I:MDD Severe w/o Psychotic features   Axis II: PTSD and Panic Disorder    Substance Abuse:No concerns of substance abuse are reported      Psychiatric Treatment History:     Current psychiatrist: Dr Jaciel Echavarria  Therapist: Zack Boxer Supports: No  The patient requires ambulatory assistance: No    Legal Issues: No Legal Issues    Action: record recieved and laboratory work received    ACCEPTED Appointment Date: FREIDA 15, 2018    DENIED Reason: None

## 2018-06-15 ENCOUNTER — OFFICE VISIT (OUTPATIENT)
Dept: PSYCHOLOGY | Facility: CLINIC | Age: 53
End: 2018-06-15
Payer: COMMERCIAL

## 2018-06-15 ENCOUNTER — OFFICE VISIT (OUTPATIENT)
Dept: PSYCHIATRY | Facility: CLINIC | Age: 53
End: 2018-06-15
Payer: COMMERCIAL

## 2018-06-15 VITALS
BODY MASS INDEX: 27 KG/M2 | HEART RATE: 90 BPM | SYSTOLIC BLOOD PRESSURE: 110 MMHG | RESPIRATION RATE: 18 BRPM | WEIGHT: 143 LBS | DIASTOLIC BLOOD PRESSURE: 70 MMHG | HEIGHT: 61 IN

## 2018-06-15 DIAGNOSIS — F41.1 GENERALIZED ANXIETY DISORDER: Chronic | ICD-10-CM

## 2018-06-15 DIAGNOSIS — F41.0 PANIC DISORDER WITHOUT AGORAPHOBIA: Chronic | ICD-10-CM

## 2018-06-15 DIAGNOSIS — F43.12 PROLONGED POSTTRAUMATIC STRESS DISORDER: Chronic | ICD-10-CM

## 2018-06-15 DIAGNOSIS — F33.2 SEVERE EPISODE OF RECURRENT MAJOR DEPRESSIVE DISORDER, WITHOUT PSYCHOTIC FEATURES (HCC): Primary | Chronic | ICD-10-CM

## 2018-06-15 DIAGNOSIS — Z79.899 HIGH RISK MEDICATIONS (NOT ANTICOAGULANTS) LONG-TERM USE: Primary | ICD-10-CM

## 2018-06-15 DIAGNOSIS — F33.2 SEVERE EPISODE OF RECURRENT MAJOR DEPRESSIVE DISORDER, WITHOUT PSYCHOTIC FEATURES (HCC): Chronic | ICD-10-CM

## 2018-06-15 PROCEDURE — S9480 INTENSIVE OUTPATIENT PSYCHIA: HCPCS

## 2018-06-15 PROCEDURE — 90792 PSYCH DIAG EVAL W/MED SRVCS: CPT | Performed by: PSYCHIATRY & NEUROLOGY

## 2018-06-15 PROCEDURE — 90791 PSYCH DIAGNOSTIC EVALUATION: CPT

## 2018-06-15 PROCEDURE — S0201 PARTIAL HOSPITALIZATION SERV: HCPCS

## 2018-06-15 NOTE — PSYCH
Subjective:     Patient ID: Stephanie Espinoza is a 47 y o  female  [facility] Clinical Progress Notes      Specialized Services Documentation  Therapist must complete separate progress note for each specific clinical activity in which the individual participated during the day  Group Psychotherapy   ([id number]) Munira participated in psychotherapy group focused on distress tolerance and the importance of self-care  Munira identified staying consistent with her attendance in program as a stressor due to her chronic pain  She actively engaged in group discussion, providing support and encouragement to peers that discussed situations that they were struggling to manage  Group discussed ways to manage upsetting or anger-provoking situations, and ways to tolerate their emotions  Group also discussed prioritizing self-care, and planning ahead to incorporate time for oneself into each day  Some beginning progress noted toward goals  Continue psychotherapy group to encourage Munira to explore stressors and coping  Tx Plan Objective: 1 1, 1 2, Therapist:  Rocio BYERS    Other [id number]- call to Ty Cortez for precert  Spoke with Marco Lynn  IOP only authorized from 6/15/18 through 7/11/18  (12 sessions)  Auth# [id number]    Case Management Note  ([id number]) This CM met with Munira for initial assessment  ROIs signed for PCP, emergency contact and outpatient psych providers  Program basics explained and on-call/crisis numbers provided  Munira denies SI HI and psychosis today  Rocio BYERS    Current suicide risk : Low     Medications changes/added/denied? No    Treatment session number: 1    Individual Case Management Visit provided today?  Yes     [facility] follow up physician's orders: n/a

## 2018-06-15 NOTE — PSYCH
Subjective:     Patient ID: Lenard Stanford is a 47 y o  female  [facility] Clinical Progress Notes      Specialized Services Documentation  Therapist must complete separate progress note for each specific clinical activity in which the individual participated during the day  Group Psychotherapy 9:30 to 10:30 Munira participated in a group discussing The Weekly Wellness Chart  She was active in her participation and appeared to be learning from the discussion  She liked hearing from everyone else  She will continue to benefit from group participation     Plan Objective: 1 1, 1 2, Therapist: Horace Landin

## 2018-06-15 NOTE — PSYCH
Subjective:     Patient ID: Pee aPlencia is a 47 y o  female  [facility] Clinical Progress Notes      Specialized Services Documentation  Therapist must complete separate progress note for each specific clinical activity in which the individual participated during the day  Allied Therapy   [id number] Pee Palencia excused from [facility] group due to case management evaluation       Tx Plan Objective: na, Therapist:  Tabitha GARZON    Education Therapy   Time:  6641-5267  Previous goal met: first treatment day  Readiness to Learning: Receptive  Barriers to Learning: None  Learning Assessment  Time: 6484-0800  Education Completed: Illness, Medication and Wellness Tools  Teaching Method: Verbal  Shared Area of Learning: Yes   Goal Set: physically prepare self for group next week due to physical pain  Tx Plan Objective: 1 1, Therapist:  Tabitha GARZON
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
## 2018-06-15 NOTE — TELEPHONE ENCOUNTER
Please call patient to follow-up  I have been unable to call her back  Please encourage her to reschedule and keep her appointment

## 2018-06-18 ENCOUNTER — DOCUMENTATION (OUTPATIENT)
Dept: PSYCHOLOGY | Facility: CLINIC | Age: 53
End: 2018-06-18
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
## 2018-06-19 ENCOUNTER — OFFICE VISIT (OUTPATIENT)
Dept: PSYCHOLOGY | Facility: CLINIC | Age: 53
End: 2018-06-19
Payer: COMMERCIAL

## 2018-06-19 DIAGNOSIS — F41.1 GENERALIZED ANXIETY DISORDER: Primary | Chronic | ICD-10-CM

## 2018-06-19 DIAGNOSIS — F33.2 SEVERE EPISODE OF RECURRENT MAJOR DEPRESSIVE DISORDER, WITHOUT PSYCHOTIC FEATURES (HCC): Chronic | ICD-10-CM

## 2018-06-19 DIAGNOSIS — F41.0 PANIC DISORDER WITHOUT AGORAPHOBIA: Chronic | ICD-10-CM

## 2018-06-19 DIAGNOSIS — F43.12 PROLONGED POSTTRAUMATIC STRESS DISORDER: Chronic | ICD-10-CM

## 2018-06-19 PROCEDURE — S9480 INTENSIVE OUTPATIENT PSYCHIA: HCPCS

## 2018-06-19 NOTE — PSYCH
Subjective:     Patient ID: Dominic Lay is a 47 y o  female  [facility] Clinical Progress Notes      Specialized Services Documentation  Therapist must complete separate progress note for each specific clinical activity in which the individual participated during the day  Group Psychotherapy  [id number]  Munira participated in Wellness Group focused on using healthy strategies to handle dealing with difficult situations and  individuals,  including individuals who trigger anger or other difficult emotions  Munira was attentive to education and handout and actively participated in peer discussion of what strategy she would find most effective using in difficult situations  Munira stated that she can "remain calm" in situations where she is more familiar  Discussed that "it takes practice and exposure to certain difficult situations and types of individuals before you can remain calm in the face of this stress " Munira stated that she understood the "fight or flight" reaction when anxious and how this will work against "remaining calm " Munira can use relaxation breathing to calm herself  She made good progress toward goals  Continue to offer this group to provide education and practice using  cognitive and behavioral strategies to help cope, in healthy ways, with challenging situations and individuals who may be acting in [place] or anxiety-provoking ways  1 1,1 2,1 4   Tx Plan Objective:   Therapist:  Samir Sue RN

## 2018-06-19 NOTE — PSYCH
Subjective:     Patient ID: Bonnie Middleton is a 47 y o  female  [facility] Clinical Progress Notes      Specialized Services Documentation  Therapist must complete separate progress note for each specific clinical activity in which the individual participated during the day  Allied Therapy [id number] Munira actively shared in [facility] group focused on understanding and sharing emotions  She engaged in nella analysis, instrument improvisation, and discussion  Group discussed how not understanding emotions causes someone to be less likely to reach out for support, leading to feeling alone  Group explored sections of WRAP on feeling well, daily maintenance plan, warning signs, and action plan  Munira shared that she feels like others cannot understand her due to her chronic pain   Tx Plan Objective: 1 2 Therapist:  Mariann GARZON

## 2018-06-19 NOTE — PSYCH
Subjective:     Patient ID: Cristian Salas is a 47 y o  female  [facility] Clinical Progress Notes      Specialized Services Documentation  Therapist must complete separate progress note for each specific clinical activity in which the individual participated during the day         Education Therapy   Time:  6931-4330  Previous goal met: Yes   Readiness to Learning: Receptive  Barriers to Learning: None  Learning Assessment  Time: 2342-4366  Education Completed: Illness and Wellness Tools  Teaching Method: Verbal, Written and Demonstration  Shared Area of Learning: Yes   Goal Set: use strategies learned related to managing difficult people  Tx Plan Objective: 1 1, Therapist:  Adalgisa LEEBC

## 2018-06-19 NOTE — PSYCH
Subjective:     Patient ID: Erin Thomas is a 47 y o  female  [facility] Clinical Progress Notes      Specialized Services Documentation  Therapist must complete separate progress note for each specific clinical activity in which the individual participated during the day  Group Psychotherapy   [phone]) Munira participated in psychotherapy group focused on the benefits and consequences of helping others, and the importance of recognizing when to set boundaries with those one would like to help  Munira identified having a lot of physical pain and pushing herself to get to program today as a stressor  She actively engaged in group discussion, noting that she is the type of person that gets satisfaction out of helping others, especially based on her [employer]  However, she has begun to recognize that she has to set limits because she is not able to financially afford to help others every time they need it  Group discussed the gratification that one can receive from helping other people, and also discussed that it can be important to set boundaries in order to protect oneself from taking away from one's own needs and well-being  Good beginning progress noted toward goals  Continue psychotherapy group to encourage Munira to explore stressors and coping  Tx Plan Objective: 1 1, 1 2, Therapist:  Juana BYERS    Case Management Note  [phone]  This CM met with Munira to review treatment plan  She was agreeable with treatment plan goals, and signed and received copy of same  Munira stated that she was in excruciating pain yesterday and could not get out of bed, but feels somewhat better today so she was able to push to come to program   She said that she feels she is already benefiting from program, and wants to incorporate more meditation and mindfulness into her daily routine  Juana BYERS    Current suicide risk : Low     Medications changes/added/denied?  No    Treatment session number: 2    Individual Case Management Visit provided today?  Yes     [facility] follow up physician's orders: n/a

## 2018-06-20 ENCOUNTER — OFFICE VISIT (OUTPATIENT)
Dept: PSYCHOLOGY | Facility: CLINIC | Age: 53
End: 2018-06-20
Payer: COMMERCIAL

## 2018-06-20 DIAGNOSIS — F43.12 PROLONGED POSTTRAUMATIC STRESS DISORDER: Chronic | ICD-10-CM

## 2018-06-20 DIAGNOSIS — F33.2 SEVERE EPISODE OF RECURRENT MAJOR DEPRESSIVE DISORDER, WITHOUT PSYCHOTIC FEATURES (HCC): Chronic | ICD-10-CM

## 2018-06-20 DIAGNOSIS — F41.0 PANIC DISORDER WITHOUT AGORAPHOBIA: Chronic | ICD-10-CM

## 2018-06-20 DIAGNOSIS — F41.1 GENERALIZED ANXIETY DISORDER: Primary | Chronic | ICD-10-CM

## 2018-06-20 PROCEDURE — S9480 INTENSIVE OUTPATIENT PSYCHIA: HCPCS

## 2018-06-20 NOTE — PSYCH
Subjective:     Patient ID: Marquis Baker is a 47 y o  female  [facility] Clinical Progress Notes      Specialized Services Documentation  Therapist must complete separate progress note for each specific clinical activity in which the individual participated during the day  Group Psychotherapy  [phone]  Munira participated in Wellness group focused on the relationship between sleep disturbances,  mood disturbances and also physical health  Munira completed sleep hygiene quiz and discussed sleeping medications with peers  She also discussed formulating good sleeping habits for herself and explained how it is important that children develop good sleeping habits  Munira discussed the negative impact of certain things such as "blue lights from electronics in your bedroom, TV on, caffeine at night, etc " can affect sleep negatively  Munira made good progress toward goals  Continue group to provide individual education on how to improve mood through development of healthier sleeping habits  1 1,1 2,1 3   Tx Plan Objective:   Therapist:  Lalita Ryan RN

## 2018-06-20 NOTE — PSYCH
Subjective:     Patient ID: Ivanna Fountain is a 47 y o  female  [facility] Clinical Progress Notes      Specialized Services Documentation  Therapist must complete separate progress note for each specific clinical activity in which the individual participated during the day  Allied Therapy   [id number] Munira actively shared in [facility] group exploring DBT distress tolerance  Group explored goals of Distress tolerance and introducing skills STOP, TIP, Wise Mind ACCEPTS, and Pros and Cons  Exercises reinforced strategies to implement the skills in real life situations  She shared a potential ability to use progressive muscle relaxation and/or ice to help her during panic attacks as her body shuts down during these episodes  The importance of skill practice when not in distress reinforced  Some progress toward goal noted  Continue AT to encourage learning, practice and home practice of skills to manage distress      Tx Plan Objective: 1 1, Therapist:  Karli GARZON    Education Therapy   Time:  0352-1740  Previous goal met: Yes   Readiness to Learning: Receptive  Barriers to Learning: None  Learning Assessment  Time: 3246-3105  Education Completed: Illness and Wellness Tools  Teaching Method: Verbal and Demonstration  Shared Area of Learning: Yes   Goal Set: work on consistent sleep schedule  Tx Plan Objective: 1 2, Therapist:  Karli GARZON

## 2018-06-20 NOTE — PSYCH
Subjective:     Patient ID: Amie Gudino is a 47 y o  female  [facility] Clinical Progress Notes      Specialized Services Documentation  Therapist must complete separate progress note for each specific clinical activity in which the individual participated during the day  Group Psychotherapy  [phone]) Munira participated in psychotherapy group focused on honoring one's emotions  Munira actively engaged in group discussion, talking about the grief she has felt over losses in her life, particularly her father, as well as her anxiety and depression and how they have interfered with her ability to enjoy her life  Group discussed the importance of acknowledging and honoring each emotion that one experiences, and allowing time to sit with each emotion in order to process it in a healthy way  Peers shared ideas for self-care activities and discussed the concept of a self-soothe kit for times when emotions are overwhelming  Munira provided appropriate input into discussion and good emotional support to a peer  Moderate progress noted toward goals today  Continue psychotherapy group to encourage Munira to explore stressors and coping  Tx Plan Objective: 1 1, 1 2, Therapist:  Merlin BYERS    Case Management Note    Merlin BYERS    Current suicide risk : Low     Medications changes/added/denied? No    Treatment session number: 3    Individual Case Management Visit provided today?  No    [facility] follow up physician's orders: n/a

## 2018-06-21 ENCOUNTER — DOCUMENTATION (OUTPATIENT)
Dept: PSYCHOLOGY | Facility: CLINIC | Age: 53
End: 2018-06-21

## 2018-06-22 ENCOUNTER — DOCUMENTATION (OUTPATIENT)
Dept: PSYCHOLOGY | Facility: CLINIC | Age: 53
End: 2018-06-22

## 2018-06-25 ENCOUNTER — TELEPHONE (OUTPATIENT)
Dept: PSYCHOLOGY | Facility: CLINIC | Age: 53
End: 2018-06-25

## 2018-06-25 ENCOUNTER — DOCUMENTATION (OUTPATIENT)
Dept: PSYCHOLOGY | Facility: CLINIC | Age: 53
End: 2018-06-25

## 2018-06-25 NOTE — TELEPHONE ENCOUNTER
2467-call to Munira to discuss absence and schedule for the week  Munira advised that, due to her pain levels and her inability to be consistent with attendance, that she feels it would be best for her to stop coming to program at this time  She stated that she learned quite a bit being in program, and that her biggest take-away is that she is not the only one suffering from emotional pain  She expressed appreciation for everything staff did for her, and that she would like to consider coming back to program at some point in the future should her physical health improve  This CM advised that she would be welcome back if there comes a time when she is physically more capable of sitting through the long program days and she needs the support  Encouraged her to follow up with both [facility] and Dr Antoni Weaver, which she said that she would

## 2018-06-26 ENCOUNTER — DOCUMENTATION (OUTPATIENT)
Dept: PSYCHOLOGY | Facility: CLINIC | Age: 53
End: 2018-06-26

## 2018-06-26 NOTE — PROGRESS NOTES
Damon Nazario will be discharged from [facility] today as per her request   See separate note for Dr Greyson Merrill order

## 2018-06-26 NOTE — PROGRESS NOTES
[facility]  FAMILY PRACTICE ACUTE OFFICE VISIT       NAME: Tricia Gomez  AGE: 47 y o  SEX: female       : 1965        MRN: [id number]    DATE: 2018  TIME: 5:08 PM    Assessment and Plan     Problem List Items Addressed This Visit     Fibromyalgia - Primary       Encouraged to continue with her rheumatologist and gabapentin as prescribed by them  She was also encouraged to try aqua therapy to see if this would be helpful for her pains  Relevant Orders    Ambulatory referral to Physical Therapy    Severe episode of recurrent major depressive disorder, without psychotic features ([facility] [address] ) (Chronic)       Stable  She reports no improvement since the recent start of Abilify yet  She was encouraged to continue with this as well as her Cymbalta and trazodone as prescribed by them  She will be seen by her therapist early next week  She was encouraged to continue with them regularly  We discussed that hopefully her upcoming time away with her sister in [place] will be helpful to decrease some of her stress which is likely contributing to her difficulty in achieving improvement  Will continue to monitor  Other insomnia (Chronic)       Improved since psychiatry added on Abilify to her regimen  She was encouraged to continue to follow with them regularly  We will continue to monitor  Fibromyalgia    Encouraged to continue with her rheumatologist and gabapentin as prescribed by them  She was also encouraged to try aqua therapy to see if this would be helpful for her pains  Other insomnia    Improved since psychiatry added on Abilify to her regimen  She was encouraged to continue to follow with them regularly  We will continue to monitor  Severe episode of recurrent major depressive disorder, without psychotic features ([facility] [address] )    Stable  She reports no improvement since the recent start of Abilify yet    She was encouraged to continue with this as well as her Cymbalta and trazodone as prescribed by them  She will be seen by her therapist early next week  She was encouraged to continue with them regularly  We discussed that hopefully her upcoming time away with her sister in [place] will be helpful to decrease some of her stress which is likely contributing to her difficulty in achieving improvement  Will continue to monitor  Chief Complaint     Chief Complaint   Patient presents with    Follow-up     depreesion and anxiety    Headache    Multiple Falls       History of Present Illness   Latrice Jang is a 47y o -year-old female who presents for follow-up on several falls  Notes that she was trying the [facility] program for her depression    Notes that she has therapist (next visit on 7/3) - will be seeing her psychiatrist - notes that she had fallen 3 times out of bed and is not sure if getting up too fast - also fell once after already up for the day and states that she was trying to carry a bag of groceries to the kitchen for daughter - caught herself each time despite notes that her evenings and early mornings are hard for her - notes that she has trouble with being fatigued - notes that she had a med change with the Abilify (on for about 2 weeks) added but notes that she was given this after the falls and has not had any falls since then - notes that her depression is stable so far but sleeping a little better     Notes that she has more body pain - rheumatologist thinks she may have a flare currently - has been trying 1 gabapentin in the morning in addition to the 3 at bedtime - not noticing improvement yet but thinks that she has only tried a few days so far    States that she was getting headaches wit the gabapentin    Also notes OCD tendencies like checking the lock or alarms - notes perfectionist about things in the sink,etc           Review of Systems   Review of Systems   Constitutional: Positive for fatigue  Cardiovascular: Positive for palpitations  Musculoskeletal: Positive for back pain  Neurological: Positive for light-headedness (slight) and headaches  Psychiatric/Behavioral: Positive for dysphoric mood and sleep disturbance (broken sleep from midnight to 10-11 am but is broken sleep)  The patient is nervous/anxious  Active Problem List     Patient Active Problem List   Diagnosis    Allergic rhinitis    Costochondritis    Fibromyalgia    Generalized anxiety disorder    Severe episode of recurrent major depressive disorder, without psychotic features (HCC)    Menopause    Migraine headache    Other insomnia    Panic disorder without agoraphobia    Prolonged posttraumatic stress disorder    Pulmonary nodule    Sacroiliitis (HCC)    Vitamin D deficiency         Social History:  Social History     Social History    Marital status: Legally      Spouse name: N/A    Number of children: 2    Years of education: Vocational school for Medical Assistant     Occupational History    Not on file  Social History Main Topics    Smoking status: Never Smoker    Smokeless tobacco: Never Used    Alcohol use Yes      Comment: Social alcohol use: 1 glass of wine on special occasions    Drug use: No    Sexual activity: Not Currently     Other Topics Concern    Not on file     Social History Narrative    Education: high school graduate and vocational school for Medical Assistant    Learning Disabilities: none    Marital History:     Children: 1 adult son, 1 adult daughter    Living Arrangement: lives in home with adult daughter    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, currently unemployed, applied for disability    Functioning Relationships: poor relationship with     Legal History: none     History: None       Objective     Vitals:    06/29/18 1445   BP: 96/62   Pulse:       Wt Readings from Last 3 Encounters: 06/29/18 64 9 kg (143 lb)   05/21/18 65 1 kg (143 lb 8 oz)   11/06/17 63 kg (139 lb)       Physical Exam   Constitutional: She appears well-developed and well-nourished  No distress  Neck: Neck supple  No thyromegaly present  Cardiovascular: Normal rate, regular rhythm, normal heart sounds and intact distal pulses  No murmur heard  Pulmonary/Chest: Effort normal and breath sounds normal  She has no wheezes  She has no rales  Musculoskeletal: She exhibits no edema  Skin: Skin is warm and dry  No rash noted  Psychiatric: Her behavior is normal    Depressed   Vitals reviewed        Pertinent Laboratory/Diagnostic Studies:  Results for orders placed or performed in visit on 04/23/18   Comprehensive metabolic panel   Result Value Ref Range    SL AMB GLUCOSE 80 65 - 99 mg/dL    BUN 11 7 - 25 mg/dL    Creatinine, Serum 0 79 0 70 - 1 33 mg/dL    eGFR Non  104 > OR = 60 mL/min/1 73m2    SL AMB EGFR  120 > OR = 60 mL/min/1 73m2    SL AMB BUN/CREATININE RATIO NOT APPLICABLE 6 - 22 (calc)    SL AMB SODIUM 137 135 - 146 mmol/L    SL AMB POTASSIUM 4 0 3 5 - 5 3 mmol/L    SL AMB CHLORIDE 101 98 - 110 mmol/L    SL AMB CARBON DIOXIDE 27 20 - 31 mmol/L    SL AMB CALCIUM 9 4 8 6 - 10 3 mg/dL    SL AMB PROTEIN, TOTAL 7 0 6 1 - 8 1 g/dL    Serum Albumin 4 3 3 6 - 5 1 g/dL    SL AMB GLOBULIN 2 7 1 9 - 3 7 g/dL (calc)    SL AMB ALBUMIN/GLOBULIN RATIO 1 6 1 0 - 2 5 (calc)    SL AMB BILIRUBIN, TOTAL 0 4 0 2 - 1 2 mg/dL    SL AMB ALKALINE PHOSPHATASE 65 40 - 115 U/L    SL AMB AST 16 10 - 35 U/L    SL AMB ALT 12 9 - 46 U/L   HLA-B27 antigen   Result Value Ref Range    HLA-B27 NEGATIVE NEGATIVE   Sedimentation rate, automated   Result Value Ref Range    SL AMB SED RATE BY MODIFIED WESTERGREN 14 < OR = 20 mm/h   CBC   Result Value Ref Range    SL AMB LAB WHITE BLOOD CELL COUNT 4 9 3 8 - 10 8 Thousand/uL    SL AMB LAB RED BLOOD CELLS 4 52 4 20 - 5 80 Million/uL    Hemoglobin 12 6 (L) 13 2 - 17 1 g/dL    Hematocrit 37 3 (L) 38 5 - 50 0 %    MCV 82 5 80 0 - 100 0 fL    MCH 27 9 27 0 - 33 0 pg    MCHC 33 8 32 0 - 36 0 g/dL    RDW 12 3 11 0 - 15 0 %    Platelet Count 012 234 - 400 Thousand/uL    SL AMB MPV 9 1 7 5 - 12 5 fL   Lyme Antibody Profile with reflex to WB   Result Value Ref Range    SL AMB LYME AB SCREEN <0 90 index   ROMY Screen w/ Reflex to Titer/Pattern   Result Value Ref Range    SL ROMY SCREEN, IFA NEGATIVE NEGATIVE   Cyclic citrul peptide antibody, IgG   Result Value Ref Range    Cyclic Citrullinated Peptide (CCP) Ab (IgG) <16 UNITS   Rheumatoid Factor   Result Value Ref Range    Rheumatoid Factor <14 <14 IU/mL   C-reactive protein   Result Value Ref Range    C-Reactive Protein, Quant 2 5 <8 0 mg/L   Hepatitis B surface antigen   Result Value Ref Range    HBsAg Screen NON-REACTIVE NON-REACTIVE   Hepatitis C Ab W/Refl To HCV RNA, Qn, PCR   Result Value Ref Range    SL AMB HEP C AB NON-REACTIVE NON-REACTIVE    Signal to Cut-Off 0 85 <1 00   Vitamin D 25 hydroxy   Result Value Ref Range    Vitamin D, 25-Hydroxy, Serum 19 (L) 30 - 100 ng/mL   Urinalysis with reflex to microscopic   Result Value Ref Range    SL AMB COLOR, URINE YELLOW YELLOW    Urine Appearance CLEAR CLEAR    Specific Gravity 1 021 1 001 - 1 035    Ph 6 5 5 0 - 8 0    SL AMB GLUCOSE, URINE, QUAL  NEGATIVE NEGATIVE    SL AMB BILIRUBIN, URINE NEGATIVE NEGATIVE    SL AMB KETONE, URINE, QUAL  NEGATIVE NEGATIVE    SL AMB BLOOD, URINE NEGATIVE NEGATIVE    SL AMB PROTEIN, URINE, QUAL  NEGATIVE NEGATIVE    SL AMB NITRITES URINE, QUAL   NEGATIVE NEGATIVE    SL AMB LEUKOCYTE ESTERASE URINE NEGATIVE NEGATIVE       Orders Placed This Encounter   Procedures    Ambulatory referral to Physical Therapy       ALLERGIES:  Allergies   Allergen Reactions    Dust Mite Extract     Pollen Extract     Tree Extract        Current Medications     Current Outpatient Prescriptions   Medication Sig Dispense Refill    acyclovir (ZOVIRAX) 800 mg tablet Take 1 tablet by mouth 2 (two) times a day      ARIPiprazole (ABILIFY) 2 mg tablet Take 1 tablet (2 mg total) by mouth daily at bedtime for 120 days 30 tablet 3    cetirizine (ZYRTEC ALLERGY) 10 mg tablet Take 1 tablet by mouth daily as needed      clonazePAM (KlonoPIN) 0 5 mg tablet Take 1 tablet (0 5 mg total) by mouth 3 (three) times a day for 120 days To be filled on or after 6/13/18 90 tablet 3    DULoxetine (CYMBALTA) 60 mg delayed release capsule Take 2 capsules (120 mg total) by mouth daily for 120 days 60 capsule 3    ergocalciferol (ERGOCALCIFEROL) 70209 units capsule Take 50,000 Units by mouth once a week      estradiol (ESTRACE) 2 MG tablet Take 2 mg by mouth daily      flunisolide (NASALIDE) 25 MCG/ACT (0 025%) SOLN Inhale 2 sprays every 12 (twelve) hours 1 Bottle 3    gabapentin (NEURONTIN) 100 mg capsule Take 1 capsule by mouth 3 (three) times a day      ibuprofen (MOTRIN) 400 mg tablet Take 400 mg by mouth 3 (three) times a day      Misc Natural Products (COLON CLEANSE) CAPS Take 1 capsule by mouth daily      traZODone (DESYREL) 50 mg tablet Take 1 tablet (50 mg total) by mouth daily at bedtime as needed for sleep for up to 120 days 30 tablet 3     No current facility-administered medications for this visit            Cliff Braun PA-C  6/29/2018 5:08 PM  [facility]

## 2018-06-26 NOTE — PROGRESS NOTES
Assessment/Plan:       Diagnoses and all orders for this visit:     Severe episode of recurrent major depressive disorder, without psychotic features ([address] )     Generalized anxiety disorder     Panic disorder without agoraphobia            Subjective:      Patient ID: Juan Canales is a 47 y o  female      [facility] Treatment Plan   AREAS OF NEED: Depression and anxiety as evidenced by frequent crying spells, loneliness, feeling as though everything is her fault, poor concentration, sleep and appetite disturbances, panic attacks  Date Initiated: 6/15/2018     Strengths: strong mary; good support system; various interests; motivated to seek treatment     LONG TERM GOAL:   1 0 I will identify three signs that my anxiety and depression have improved, and that I feel better able to manage my current life circumstances  Date Initiated: 6/15/2018  Target Date: 7/13/2018     Completion Date: not completed     SHORT TERM OBJECTIVES:   1 1  I will identify two new skills learned in the program that help to reduce my symptoms of anxiety and depression, and will practice at least one of these skills daily  Date Initiated: 6/15/2018  Revision Date:  Target Date: 7/2/2018  Completion Date: not completed     1 2 I will schedule at least one activity each week that feels nurturing and fulfilling to me, and will say no to any activity that I feel is not physically or emotionally manageable  Date Initiated: 6/15/2018  Revision Date:  Target Date: 7/2/2018  Completion Date: not completed     1 3 I will take my medication as prescribed, and will talk to program staff should I have any questions or concerns about them  Date Initiated: 6/15/2018  Revision Date:   Target Date: 7/2/2018  Completion Date: not completed     1 4 I will identify three ways that my supports can help me with my recovery, and agree to staff/support contact should I need help    Date Initiated: 6/15/2018  Revision Date:  Target Date: 7/2/2018   Completion Date: not completed          7 DAY REVISION:     Date Initiated:  Revision Date:   Target Date:   Completion Date:     PSYCHIATRY:  Medication management and education  Date Initiated: 6/15/2018       Revision Date:   The person(s) responsible for carrying out the plan is Varinder Harris MD     NURSIN 1, 1 2, 1 3, 1 4 Provide daily wellness group to educate Munira on the signs and symptoms of diagnosis and medications used in treatment  Date Initiated: 6/15/2018  Revision Date:  The person(s) responsible for carrying out the plan is Isaac Zamudio RN     PSYCHOLOGY:   1 1, 1 2, 1 4 Provide psychotherapy group 5 times per week to allow opportunity for Munira to explore stressors, build coping skills and relate to peers regarding similar issues  Date Initiated: 6/15/2018  Revision Date:   The person(s) responsible for carrying out the plan is MODESTA Huertas     ALLIED THERAPY:   1 1, 1 2 Engage Munira Fritz in AT group 5 times daily to encourage development and use of wellness tools to decrease symptoms and promote recovery through meaningful activity  Date Initiated: 6/15/2018  Revision Date:   The person(s) responsible for carrying out the plan is DURAN Mohr     CASE MANAGEMENT:   1 0  This  will meet with Munira 3-4 times weekly to assess treatment progress, discharge planning, connection to community supports and UR as indicated  Date Initiated: 6/15/2018  Revision Date:   The person(s) responsible for carrying out the plan is MODESTA Huertas        TREATMENT REVIEW/COMMENTS: Damon Nazario was unable to complete treatment plan goals due to lack of attendance and early discharge from the program as per her request      DISCHARGE CRITERIA: Identify three signs of improvement and complete relapse prevention plan    DISCHARGE PLAN: return to [facility] and Dr Jessie Haider for outpatient treatment  Estimated Length of Stay: 12 IOP days        Diagnosis and Treatment Plan explained to Grayce Billing relates understanding diagnosis and is agreeable to Treatment Plan                CLIENT COMMENTS / Please share your thoughts, feelings, need and/or experiences regarding your treatment plan: _____________________________________________________________________________________________________________________________________________________________________________________________________________________________________________________________________________________________________________________ Date/Time: ______________      Patient Signature: _________________________________      Date/Time: ______________       Signature: _________________________________      Date/Time: ______________

## 2018-06-26 NOTE — PROGRESS NOTES
Subjective:     Patient ID: Navin Waters is a 47 y o  female  [facility] Discharge Summary:   Admission Date: 6/15/2018  Patient was referred by Bryce Dominguez LCSW  Discharge Date: 6/26/2018  Was this a routine discharge? no (as per patient request)  Diagnosis: Axis I: Major Depression, recurrent, severe  Treating Physician: Luciana Paredes MD  Treatment Complications: lack of attendance due to physical pain  Presenting Need: worsening depression, anxiety, poor appetite and sleep disturbances  She is having conflicts with her daughter, is highly distraught, crying often, she feels everything is her fault  She has poor concentration, low self-esteem, irritability and excessive worries  Onset of symptoms was a few months ago with gradually worsening course since that time  Psychosocial Stressors: family, financial because she is not working, medical  and is going through divorce  She states that she  from her  since 12/3/2017 , moved with her mother but did not worked up and moved with her daughter at the end of December but they have lot of conflicts  Ramírez Lara feels anxious, depressed, she has a lot of nightmares lately, denies suicidal thoughts, plan or intent, denies any homicidal thoughts,  denies any psychotic symptoms , denies any manic episodes  Her PHQ-9 is 22  Course of treatment includes:    group counseling, medication management, individual case management, allied therapy, psychoeducation and psychiatric evaluation  Treatment Progress: Alessandra Marie was only able to attend 3 days of PHP programming, calling out frequently due to severe physical pain  Her treatment plan goals included focus on self-care, healthy boundary setting, and building distress tolerance skills  In the few days that Munira attended program, she interacted actively with peers and in group activities    She stated that she learned that she is not alone in feeling depressed, and expressed finding hope in being able to manage her stressors better by beginning to set boundaries and say no to people  She denies SI, HI and psychosis  Aftercare recommendations include: return to [facility] for individual therapy and Dr Evaristo Gan for medication management    Discharge Medications include:  Current Outpatient Prescriptions:     acyclovir (ZOVIRAX) 800 mg tablet, Take 1 tablet by mouth 2 (two) times a day, Disp: , Rfl:     ARIPiprazole (ABILIFY) 2 mg tablet, Take 1 tablet (2 mg total) by mouth daily at bedtime for 120 days, Disp: 30 tablet, Rfl: 3    cetirizine (ZYRTEC ALLERGY) 10 mg tablet, Take 1 tablet by mouth daily as needed, Disp: , Rfl:     clonazePAM (KlonoPIN) 0 5 mg tablet, Take 1 tablet (0 5 mg total) by mouth 3 (three) times a day for 120 days To be filled on or after 6/13/18, Disp: 90 tablet, Rfl: 3    DULoxetine (CYMBALTA) 60 mg delayed release capsule, Take 2 capsules (120 mg total) by mouth daily for 120 days, Disp: 60 capsule, Rfl: 3    ergocalciferol (ERGOCALCIFEROL) 92543 units capsule, Take 50,000 Units by mouth once a week, Disp: , Rfl:     estradiol (ESTRACE) 2 MG tablet, Take 2 mg by mouth daily, Disp: , Rfl:     flunisolide (NASALIDE) 25 MCG/ACT (0 025%) SOLN, Inhale 2 sprays every 12 (twelve) hours, Disp: 1 Bottle, Rfl: 3    gabapentin (NEURONTIN) 100 mg capsule, Take 1 capsule by mouth 3 (three) times a day, Disp: , Rfl:     ibuprofen (MOTRIN) 400 mg tablet, Take 400 mg by mouth 3 (three) times a day, Disp: , Rfl:     Misc Natural Products (COLON CLEANSE) CAPS, Take 1 capsule by mouth daily, Disp: , Rfl:     traZODone (DESYREL) 50 mg tablet, Take 1 tablet (50 mg total) by mouth daily at bedtime as needed for sleep for up to 120 days, Disp: 30 tablet, Rfl: 3

## 2018-06-29 ENCOUNTER — OFFICE VISIT (OUTPATIENT)
Dept: FAMILY MEDICINE CLINIC | Facility: CLINIC | Age: 53
End: 2018-06-29
Payer: COMMERCIAL

## 2018-06-29 VITALS
WEIGHT: 143 LBS | DIASTOLIC BLOOD PRESSURE: 62 MMHG | BODY MASS INDEX: 26.31 KG/M2 | SYSTOLIC BLOOD PRESSURE: 96 MMHG | HEIGHT: 62 IN | HEART RATE: 100 BPM

## 2018-06-29 DIAGNOSIS — G47.09 OTHER INSOMNIA: Chronic | ICD-10-CM

## 2018-06-29 DIAGNOSIS — F33.2 SEVERE EPISODE OF RECURRENT MAJOR DEPRESSIVE DISORDER, WITHOUT PSYCHOTIC FEATURES (HCC): Chronic | ICD-10-CM

## 2018-06-29 DIAGNOSIS — M79.7 FIBROMYALGIA: Primary | ICD-10-CM

## 2018-06-29 PROCEDURE — 99213 OFFICE O/P EST LOW 20 MIN: CPT | Performed by: FAMILY MEDICINE

## 2018-06-29 PROCEDURE — 3008F BODY MASS INDEX DOCD: CPT | Performed by: FAMILY MEDICINE

## 2018-06-29 NOTE — ASSESSMENT & PLAN NOTE
Stable  She reports no improvement since the recent start of Abilify yet  She was encouraged to continue with this as well as her Cymbalta and trazodone as prescribed by them  She will be seen by her therapist early next week  She was encouraged to continue with them regularly  We discussed that hopefully her upcoming time away with her sister in [place] will be helpful to decrease some of her stress which is likely contributing to her difficulty in achieving improvement  Will continue to monitor

## 2018-06-29 NOTE — PATIENT INSTRUCTIONS
Fibromyalgia    Encouraged to continue with her rheumatologist and gabapentin as prescribed by them  She was also encouraged to try aqua therapy to see if this would be helpful for her pains  Other insomnia    Improved since psychiatry added on Abilify to her regimen  She was encouraged to continue to follow with them regularly  We will continue to monitor  Severe episode of recurrent major depressive disorder, without psychotic features ([facility] [address] )    Stable  She reports no improvement since the recent start of Abilify yet  She was encouraged to continue with this as well as her Cymbalta and trazodone as prescribed by them  She will be seen by her therapist early next week  She was encouraged to continue with them regularly  We discussed that hopefully her upcoming time away with her sister in [place] will be helpful to decrease some of her stress which is likely contributing to her difficulty in achieving improvement  Will continue to monitor

## 2018-06-29 NOTE — ASSESSMENT & PLAN NOTE
Improved since psychiatry added on Abilify to her regimen  She was encouraged to continue to follow with them regularly  We will continue to monitor

## 2018-06-29 NOTE — ASSESSMENT & PLAN NOTE
Encouraged to continue with her rheumatologist and gabapentin as prescribed by them  She was also encouraged to try aqua therapy to see if this would be helpful for her pains

## 2018-07-03 ENCOUNTER — DOCUMENTATION (OUTPATIENT)
Dept: BEHAVIORAL/MENTAL HEALTH CLINIC | Facility: CLINIC | Age: 53
End: 2018-07-03

## 2018-07-03 ENCOUNTER — OFFICE VISIT (OUTPATIENT)
Dept: BEHAVIORAL/MENTAL HEALTH CLINIC | Facility: CLINIC | Age: 53
End: 2018-07-03
Payer: COMMERCIAL

## 2018-07-03 DIAGNOSIS — F41.0 PANIC DISORDER WITHOUT AGORAPHOBIA: ICD-10-CM

## 2018-07-03 DIAGNOSIS — F43.12 PROLONGED POSTTRAUMATIC STRESS DISORDER: ICD-10-CM

## 2018-07-03 DIAGNOSIS — F33.2 SEVERE RECURRENT MAJOR DEPRESSION WITHOUT PSYCHOTIC FEATURES (HCC): Primary | ICD-10-CM

## 2018-07-03 DIAGNOSIS — F41.1 GENERALIZED ANXIETY DISORDER: ICD-10-CM

## 2018-07-03 PROCEDURE — 90834 PSYTX W PT 45 MINUTES: CPT | Performed by: SOCIAL WORKER

## 2018-07-03 NOTE — PROGRESS NOTES
Treatment Plan Tracking    # 1Treatment Plan not completed within required time limits due to: Typed her plan with my signature; Was not found either in the "encounter" or "notes" file to copy to have the patient sign/date/time; reported the matter to [facility]ident # [id number] with"Eboni  "

## 2018-07-03 NOTE — PSYCH
Psychotherapy Provided: Individual Psychotherapy 45 minutes PHQ 9 = 22; SUKUMAR 7 = 16; reports was able to participate in the [facility] Program for three non consecutive days; She states did enjoy the Program and "the people I was in there with  I learned a lot  I felt like a family " "I recognized that I wasn't alone " She continues to review some of the [facility] materials which this writer (her therapist) encouraged her to bring into the individual sessions for continuity  States her primary goal was how to deal with difficulty people and the "time out" with breathing; She states has since met with her rheumatologist and her family doctor (Dr Valentino Moreno)  Reports things have calmed down a little at her daughter's residence adding, "I am handling it differently " states consults with her one sister who she states her daughter will pay attention to; discussed/reviewed her efforts to effectively manage her responses to her daughter; A: presents as straightforward in her efforts to be accountable to herself; P: (G# 1) will continue in her efforts to effectively manage her own behavior (what she can control); Length of time in session: 45 minutes, follow up in 2 week    Goals addressed in session: Goal 1     Pain:      moderate to severe    9    Current suicide risk : [address]: Diagnosis and Treatment Plan explained to Grace Small relates understanding diagnosis and is agreeable to Treatment Plan   Yes

## 2018-07-03 NOTE — PROGRESS NOTES
Amie Terese  1965       Date of Initial Treatment Plan: 10/12/16  Date of Current Treatment Plan: 07/03/18    Treatment Plan Number 6    Strengths/Personal Resources for Self Care: "hard working, organized, compulsiveness tendencies; "sort of perfectionistic;"    Diagnosis: F43 12, F33 2, F41 1, F41 0    Area of Needs: anxiousness; panic attacks; Long Term Goal 1: AI want to continue to effectively manage the anxiety  Target Date: 11/3/18  Completion Date: n/a         Short Term Objectives for Goal 1: AI want to continue to mange the boundaries with responsibilities with my grandson  B  I want to continue to ahve realistic expectations ofmyself regarding for waht I am /am not responible in my life ("having less stress") C  I want to continue to be more miindful of the imprtance of my taking care of myself (ex , being assertive with my family)  D  I will continue to be more mindful of using effective communication strategies  E  I will begin to effectively manage the panic attacks (I will provide reading material)  Target Date: 11/3/18  Completion Date: n/a    Long Term Goal 2: I want to continue to grow in wisdom concerning my choices (effective decision-making and learning from incorrect decisions)  Target Date: 11/3/18  Completion Date: N/A    Short Term Objectives for Goal 2: AI will review my values  B  I will review my affirmations  C  I will reviewmy strengths/assets/qualities  D  I will understand taht happiness isbeing ture ot one's self and know that, at times, with one's uniqueness I salud feel temporarily lonely  E  With the finalization of my divorce, I want to make sure I am making good decisions to manage this importance change in my life      Target Date: 11/3/18  Completion Date: n/a             GOAL 1: Modality: Individual 2x per month   Completion Date n/a    GOAL 2: Modality: Individual 2x per month   Completion Date [address]: Diagnosis and Treatment Plan explained to [place] Jakes relates understanding diagnosis and is agreeable to Treatment Plan  yes      Client Comments : Please share your thoughts, feelings, need and/or experiences regarding your treatment plan:       __________________________________________________________________    __________________________________________________________________    __________________________________________________________________    __________________________________________________________________    _______________________________________                Patient signature, Date Time: __________________________________________             Physician cosigner signature, Date, Time: ________________________________

## 2018-07-03 NOTE — PROGRESS NOTES
Treatment Plan Tracking    # 1Treatment Plan not completed within required time limits due to: Had typed up the treatment plan and electronically signed off; unable to copy for client's signature as the document didn ot appear either in the "encounter" or "notes" chart sections; [facility] notified with incident # [id number] spoke with "Malorie Ye;"

## 2018-08-16 ENCOUNTER — OFFICE VISIT (OUTPATIENT)
Dept: PSYCHIATRY | Facility: CLINIC | Age: 53
End: 2018-08-16
Payer: COMMERCIAL

## 2018-08-16 VITALS
BODY MASS INDEX: 26.43 KG/M2 | HEART RATE: 96 BPM | WEIGHT: 140 LBS | HEIGHT: 61 IN | DIASTOLIC BLOOD PRESSURE: 79 MMHG | SYSTOLIC BLOOD PRESSURE: 114 MMHG

## 2018-08-16 DIAGNOSIS — F41.1 GENERALIZED ANXIETY DISORDER: Chronic | ICD-10-CM

## 2018-08-16 DIAGNOSIS — F41.0 PANIC DISORDER WITHOUT AGORAPHOBIA: Chronic | ICD-10-CM

## 2018-08-16 DIAGNOSIS — G47.09 OTHER INSOMNIA: Chronic | ICD-10-CM

## 2018-08-16 DIAGNOSIS — G25.9 EXTRAPYRAMIDAL REACTION: Chronic | ICD-10-CM

## 2018-08-16 DIAGNOSIS — F43.12 POST-TRAUMATIC STRESS DISORDER, CHRONIC: Chronic | ICD-10-CM

## 2018-08-16 DIAGNOSIS — F33.2 MAJOR DEPRESSIVE DISORDER, RECURRENT, SEVERE WITHOUT PSYCHOTIC FEATURES (HCC): Primary | Chronic | ICD-10-CM

## 2018-08-16 PROCEDURE — 90833 PSYTX W PT W E/M 30 MIN: CPT | Performed by: PSYCHIATRY & NEUROLOGY

## 2018-08-16 PROCEDURE — 99213 OFFICE O/P EST LOW 20 MIN: CPT | Performed by: PSYCHIATRY & NEUROLOGY

## 2018-08-16 RX ORDER — BENZTROPINE MESYLATE 0.5 MG/1
0.5 TABLET ORAL 2 TIMES DAILY PRN
Qty: 60 TABLET | Refills: 3 | Status: SHIPPED | OUTPATIENT
Start: 2018-08-16 | End: 2018-11-30 | Stop reason: SDUPTHER

## 2018-08-16 RX ORDER — TRAZODONE HYDROCHLORIDE 50 MG/1
50 TABLET ORAL
Qty: 30 TABLET | Refills: 3 | Status: SHIPPED | OUTPATIENT
Start: 2018-08-16 | End: 2018-11-30 | Stop reason: SDUPTHER

## 2018-08-16 RX ORDER — DULOXETIN HYDROCHLORIDE 60 MG/1
120 CAPSULE, DELAYED RELEASE ORAL DAILY
Qty: 60 CAPSULE | Refills: 3 | Status: SHIPPED | OUTPATIENT
Start: 2018-08-16 | End: 2018-11-30 | Stop reason: SDUPTHER

## 2018-08-16 RX ORDER — ARIPIPRAZOLE 2 MG/1
2 TABLET ORAL
Qty: 30 TABLET | Refills: 3 | Status: SHIPPED | OUTPATIENT
Start: 2018-08-16 | End: 2018-11-30 | Stop reason: SDUPTHER
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
## 2018-08-26 ENCOUNTER — DOCUMENTATION (OUTPATIENT)
Dept: FAMILY MEDICINE CLINIC | Facility: CLINIC | Age: 53
End: 2018-08-26

## 2018-08-26 NOTE — PROGRESS NOTES
Called w ongoing cough for over 1 month, keeps her up, no fever or SOB Advise office visit tomorrow

## 2018-08-27 ENCOUNTER — TELEPHONE (OUTPATIENT)
Dept: FAMILY MEDICINE CLINIC | Facility: CLINIC | Age: 53
End: 2018-08-27

## 2018-08-27 NOTE — TELEPHONE ENCOUNTER
Please let her know that she needs to be seen prior to any controlled substance scripts  Plus if she has been coughing for a month, she needs to be seen

## 2018-08-31 RX ORDER — BENZTROPINE MESYLATE 0.5 MG/1
0.5 TABLET ORAL 2 TIMES DAILY PRN
COMMUNITY
End: 2018-10-22 | Stop reason: SDUPTHER

## 2018-09-04 ENCOUNTER — TELEPHONE (OUTPATIENT)
Dept: BEHAVIORAL/MENTAL HEALTH CLINIC | Facility: CLINIC | Age: 53
End: 2018-09-04

## 2018-09-25 ENCOUNTER — OFFICE VISIT (OUTPATIENT)
Dept: BEHAVIORAL/MENTAL HEALTH CLINIC | Facility: CLINIC | Age: 53
End: 2018-09-25
Payer: COMMERCIAL

## 2018-09-25 DIAGNOSIS — F41.0 PANIC DISORDER WITHOUT AGORAPHOBIA: ICD-10-CM

## 2018-09-25 DIAGNOSIS — F33.2 SEVERE RECURRENT MAJOR DEPRESSION WITHOUT PSYCHOTIC FEATURES (HCC): ICD-10-CM

## 2018-09-25 DIAGNOSIS — F43.12 CHRONIC POSTTRAUMATIC STRESS DISORDER: ICD-10-CM

## 2018-09-25 DIAGNOSIS — F41.1 GENERALIZED ANXIETY DISORDER: Primary | ICD-10-CM

## 2018-09-25 PROCEDURE — 90834 PSYTX W PT 45 MINUTES: CPT | Performed by: SOCIAL WORKER

## 2018-09-25 NOTE — PSYCH
Psychotherapy Provided: Individual Psychotherapy 45 minutes PHQ 9 = 17; has been experiencing headaches ("I think it is the Gabapentin ")  never did visit with her sister ([place]) during the summer; "My pain level, my depression   "  has tentative plans to visit with her sister during the Thanksgiving Holiday; "We'll see "  is waiting for a hearing date as part of the SSI application process; She noted the reported lack of boundaries/privacy persist in her family adding she had informed her daughter (who had been visiting family with her child in [place] during the summer) that she "had met someone" while her daughter had been out of town  She noted it has been pleasant and is enjoying the opportunity to be accepted for herself  She contended her daughter called Munira's sister in [place] who, allegedly, called Munira to criticize her for her spending time with someone "you hardly know " She noted her plans to visit a niece (her brother's daughter) in [place] for one week  She reported feeling "down" regarding her report of family members' negative responses to her having "met someone " discussed/reviwed boundaries regarding her sharing her personal life with her family; A: presents as feeling "down;" She is waiting to hear about the status of her SSI application  "If I had somewhere else to go   " P:(G#2) will continue to be mindful of her boundaries regarding what she chooses to/not chooses to share with her family about her personal life;     Length of time in session: 45 minutes, follow up in 2 week    Goals addressed in session: Goal 2     Pain:      moderate to severe    8    Current suicide risk : [address]: Diagnosis and Treatment Plan explained to Jose Sanchez relates understanding diagnosis and is agreeable to Treatment Plan   Yes

## 2018-10-04 DIAGNOSIS — F33.2 MAJOR DEPRESSIVE DISORDER, RECURRENT, SEVERE WITHOUT PSYCHOTIC FEATURES (HCC): Chronic | ICD-10-CM

## 2018-10-04 DIAGNOSIS — F41.1 GENERALIZED ANXIETY DISORDER: Chronic | ICD-10-CM

## 2018-10-04 RX ORDER — DULOXETIN HYDROCHLORIDE 60 MG/1
120 CAPSULE, DELAYED RELEASE ORAL DAILY
Qty: 60 CAPSULE | Refills: 0 | OUTPATIENT
Start: 2018-10-04 | End: 2019-02-01

## 2018-10-04 NOTE — TELEPHONE ENCOUNTER
Merlyn Black, sister of pt called and requested you give her a call regarding Munira   She can be reached at [phone]

## 2018-10-08 ENCOUNTER — TELEPHONE (OUTPATIENT)
Dept: PSYCHIATRY | Facility: CLINIC | Age: 53
End: 2018-10-08

## 2018-10-08 ENCOUNTER — TELEPHONE (OUTPATIENT)
Dept: BEHAVIORAL/MENTAL HEALTH CLINIC | Facility: CLINIC | Age: 53
End: 2018-10-08

## 2018-10-08 NOTE — TELEPHONE ENCOUNTER
Sister called tor relay that she is having some concerns about Munira's irrational thoughts  She states that she is having a hard time talking her down  She would like you to give her a call  [phone]

## 2018-10-09 ENCOUNTER — DOCUMENTATION (OUTPATIENT)
Dept: BEHAVIORAL/MENTAL HEALTH CLINIC | Facility: CLINIC | Age: 53
End: 2018-10-09

## 2018-10-09 ENCOUNTER — TELEPHONE (OUTPATIENT)
Dept: BEHAVIORAL/MENTAL HEALTH CLINIC | Facility: CLINIC | Age: 53
End: 2018-10-09

## 2018-10-09 ENCOUNTER — TELEPHONE (OUTPATIENT)
Dept: PSYCHIATRY | Facility: CLINIC | Age: 53
End: 2018-10-09

## 2018-10-09 NOTE — TELEPHONE ENCOUNTER
Nicko Falcon called and said Munira did text her and said she is severely depressed and could not talk  Also said she was in bed  Munira would not tell Nicko Falcon were she is  Nicko Falcon does not know what to do 
No

## 2018-10-09 NOTE — TELEPHONE ENCOUNTER
Please let family know that they should call [facility] line if they have concerns about Munira's well being or bring her to the hospital for psychiatric evaluation if she is willing to come

## 2018-10-09 NOTE — TELEPHONE ENCOUNTER
Sister called expressing serious concern for Munira  She states that she is acting out very irrationally, and that her daughter no longer wants her living with her  She says that Munira needs real help and her medications may need to be adjusted  She wishes to speak with someone about this ASAP

## 2018-10-09 NOTE — PROGRESS NOTES
10/9/18: 10 AM returned her sister's call, Ramon, [phone] ([place] resident); "I am scared to death for her  "We don't know from one minute to the next what she is going to do  I do not think she is suicidal " contends Marco Andrade is not returning family calls and that they have not heard from Marco Andrade since 9/28/18; She contends that Munira went to visit with a male friend in [facility] and was allegedly supposed to return to her mother's home in [place], [place] on 10/8/18 and as of this telephone call, Angela Forte contends, to the best of her Dariana George has not yet returned to [place], [place] to scarlett with her mother; When asked if the mother would inform them of Munira's return to her home, Angela Forte noted that her mother is currently upset witht he family and, contended it is not likely her mother would notify the family if Munira arrived at her home in [place], [place]; The sister contends that Munira noted she may, also, be staying with her niece in [address]  contends the niece is not returning family calls regarding Munira's whereabouts at this time; discussed the possibility of Marco Andrade already being at her mother's residence; offered option of Angela Forte advising Marco Andrade to contact her psychiatrrist, Dr Kendell Blandon, regarding a review of her medications and to consider filing a missing person's report;    10/9/18 - did leave a message with Munira H   Today @ approximately 9:55 AM with this writer, her therapist, returning Munira's telephone call;

## 2018-10-09 NOTE — TELEPHONE ENCOUNTER
I spoke to mahendra, she said she's fine at this time, if she feels the same again she will go to the ER or contact crisis

## 2018-10-09 NOTE — TELEPHONE ENCOUNTER
Munira called at 11:00am this morning saying that she received your call and was asleep at the time of your call  She would like you to call her back  [phone]
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
## 2018-10-09 NOTE — PROGRESS NOTES
10/9/18 2:13 PM in response to this writer (her psychotherapist) being updated by support staff that the sister called, again, speaking with support staff regarding her (the sister's) reported concerns with Munira, confirmed with support staff that via a previous telephone call today to [address] [place] [address], this writer had provided her with options and direction regarding her immediate situation; At 2: 14 PM today, 10/9/18 had, again, left a message with Munira to please contact Crisis (in whatever location she is) if she feels her safety (ex , suicidality) is at risk;  It had been confirmed with Munira of her being on the call list regarding her next appointment;

## 2018-10-10 ENCOUNTER — TELEPHONE (OUTPATIENT)
Dept: BEHAVIORAL/MENTAL HEALTH CLINIC | Facility: CLINIC | Age: 53
End: 2018-10-10

## 2018-10-10 NOTE — TELEPHONE ENCOUNTER
Patrick Comment called again very worried about Munira  Munira text Patrick Comment and said, " She is not well, I know I am not wanted and I am homeless and made a comment she does not have family anymore " Patrick Comment said she feel's as though Fidelina Viera is now suicidal " The family does not know what to do they are very worried for her  Patrick Comment feel's as though maybe if we call her she may answer and we can squeeze her in for an appointment and speak to her

## 2018-10-10 NOTE — TELEPHONE ENCOUNTER
I called Munira's cell phone  I tols her to stay at her mother's temporarily  Gave her the number of [facility]  [address] if she is homeless  Also told her if she is feeling suicidal she need's to go to the ER  Also told her that we are worried about her and we have an appointment for her Tuesday October 16th for her to come  I also told her to please give us a call

## 2018-10-11 ENCOUNTER — DOCUMENTATION (OUTPATIENT)
Dept: BEHAVIORAL/MENTAL HEALTH CLINIC | Facility: CLINIC | Age: 53
End: 2018-10-11

## 2018-10-11 ENCOUNTER — DOCUMENTATION (OUTPATIENT)
Dept: PSYCHIATRY | Facility: CLINIC | Age: 53
End: 2018-10-11

## 2018-10-11 NOTE — PROGRESS NOTES
I spoke with Yola Miles yesterday evening - she called the answering service around 6 PM  She was upset that her sister was going to send police to her mother's house where she was staying  Munira denied any suicidal thoughts during our conversation, had logical, organized speech and said she felt safe  She requested her sister was removed from chart as an emergency     Plan: will notify staff to remove sister for emergency contact list and to invalidate KYLE for the sister
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
## 2018-10-16 ENCOUNTER — DOCUMENTATION (OUTPATIENT)
Dept: BEHAVIORAL/MENTAL HEALTH CLINIC | Facility: CLINIC | Age: 53
End: 2018-10-16

## 2018-10-16 ENCOUNTER — OFFICE VISIT (OUTPATIENT)
Dept: BEHAVIORAL/MENTAL HEALTH CLINIC | Facility: CLINIC | Age: 53
End: 2018-10-16
Payer: COMMERCIAL

## 2018-10-16 DIAGNOSIS — F41.0 PANIC DISORDER WITHOUT AGORAPHOBIA: ICD-10-CM

## 2018-10-16 DIAGNOSIS — F33.2 SEVERE RECURRENT MAJOR DEPRESSION WITHOUT PSYCHOTIC FEATURES (HCC): ICD-10-CM

## 2018-10-16 DIAGNOSIS — F41.1 GENERALIZED ANXIETY DISORDER: Primary | ICD-10-CM

## 2018-10-16 DIAGNOSIS — F43.12 CHRONIC POSTTRAUMATIC STRESS DISORDER: ICD-10-CM

## 2018-10-16 PROCEDURE — 90834 PSYTX W PT 45 MINUTES: CPT | Performed by: SOCIAL WORKER

## 2018-10-16 NOTE — PROGRESS NOTES
David Paredes  1965       Date of Initial Treatment Plan: 10/12/16  Date of Current Treatment Plan: 10/16/18    Treatment Plan Number 7    Strengths/Personal Resources for Self Care: "hard working, organized, compulsiveness tendencies; "sor of perfectionistic;"    Diagnosis:  F43 12, F41 0, F41 1, F33 2    Area of Needs: anxiousness; panic attacks; Long Term Goal 1: AI want to continue to effectively manage the anxiety  Target Date:2/16/19  Completion Date: n/a         Short Term Objectives for Goal 1: AI want to continue to manage the boundaries iwth responsibilities with my grandson  B  I want to contineu to have realistic expectations of myself  regarding for waht I am/am not responsible in my life("having less stress")  C I want to continuet obe more mindful of the importance of my taking care of myself (ex , being assertive with my family)  D  I will continue to be moremidnful of using effective communication strategies  E  I will begin to effectively manage the panic attacks (I will provide reading material)          Target Date: 2/16/19  Completion Date: n/a    Long Term Goal 2: I want to ocntinue to grow in wisdom concerning my choices (effective decision-making and learning from incorrect decisions)  Target Date: 2/16/19  Completion Date: N/A    Short Term Objectives for Goal 2: AI will review my values  B  I will review my affirmations  C  I will review my strengths/assets/qualities  D  I will understand that happiness is being sure of one' self and knowing that,at times, wtih one' uniqueness I salud feel temporarily lonely  E  With the finalization of my  divorce, I want to make sure  I am making good decisions to manage this important change in my life       Target Date: 2/16/19  Completion Date: n/a    GOAL 1: Modality: Individual 2x per month   Completion Date n/a and The person(s) responsible for carrying out the plan is  [address] [place] [address]: Diagnosis and Treatment Plan explained to Miachel Konig relates understanding diagnosis and is agreeable to Treatment Plan  yes      Client Comments : Please share your thoughts, feelings, need and/or experiences regarding your treatment plan:       __________________________________________________________________    __________________________________________________________________    __________________________________________________________________    __________________________________________________________________    _______________________________________                Patient signature, Date Time: __________________________________________             Physician cosigner signature, Date, Time: ________________________________

## 2018-10-16 NOTE — PSYCH
Psychotherapy Provided: Individual Psychotherapy 45 minutes PHQ 9 = 19; SUKUMAR 7 = 17; states housing (resources had been mailed to her) informed her she qualifies for shelter for 30 days; states as she does not have an income source was allegedly informed is not eligible for other types of housing; states the SS application process is completed and stated is waiting for a hearing date; states during her temporary stay with her mother she noted, "We have been ok (with our relationship)  " states returned to her daughter's residence today 10/16/18; She confirmed that her sister is being taken off the emergency contact list (via Dr Jessie Oreilly efforts)  states is "down" today due to her report of the recent passing of her paternal aunt ([place]); described her relationship with her aunt as "real close;" discussed/reviewed boundaries regarding her family; EMERGENCY CONTACT: BOSTON LOPES (MOTHER), [address], [address] [place]; [phone]; A: presents as somewhat with flat affect attributing her flat affect to her reported sadness at the recent passing of her aunt; confirms her desire to be able to live elsewhere other than with her daughter (or for other family members); P: (G#1 ) will be mindful of maintaining boundaries with family and others to include not engaging if conversations will be come volitile;        Length of time in session: 45 minutes, follow up in 2 week    Goals addressed in session: Goal 1     Pain:      moderate to severe    8    Current suicide risk : [address]: Diagnosis and Treatment Plan explained to Jonnie Getachew relates understanding diagnosis and is agreeable to Treatment Plan   Yes

## 2018-10-22 ENCOUNTER — APPOINTMENT (OUTPATIENT)
Dept: LAB | Facility: CLINIC | Age: 53
End: 2018-10-22
Payer: COMMERCIAL

## 2018-10-22 ENCOUNTER — OFFICE VISIT (OUTPATIENT)
Dept: FAMILY MEDICINE CLINIC | Facility: CLINIC | Age: 53
End: 2018-10-22
Payer: COMMERCIAL

## 2018-10-22 VITALS
HEART RATE: 100 BPM | DIASTOLIC BLOOD PRESSURE: 72 MMHG | HEIGHT: 62 IN | TEMPERATURE: 99.1 F | OXYGEN SATURATION: 98 % | WEIGHT: 146.5 LBS | BODY MASS INDEX: 26.96 KG/M2 | SYSTOLIC BLOOD PRESSURE: 96 MMHG

## 2018-10-22 DIAGNOSIS — M54.32 SCIATICA OF LEFT SIDE: ICD-10-CM

## 2018-10-22 DIAGNOSIS — Z12.11 ENCOUNTER FOR SCREENING COLONOSCOPY: ICD-10-CM

## 2018-10-22 DIAGNOSIS — M79.7 FIBROMYALGIA: ICD-10-CM

## 2018-10-22 DIAGNOSIS — Z23 FLU VACCINE NEED: ICD-10-CM

## 2018-10-22 DIAGNOSIS — F33.2 SEVERE RECURRENT MAJOR DEPRESSION WITHOUT PSYCHOTIC FEATURES (HCC): Chronic | ICD-10-CM

## 2018-10-22 DIAGNOSIS — J30.9 ALLERGIC RHINITIS, UNSPECIFIED SEASONALITY, UNSPECIFIED TRIGGER: ICD-10-CM

## 2018-10-22 DIAGNOSIS — Z23 NEED FOR TDAP VACCINATION: ICD-10-CM

## 2018-10-22 DIAGNOSIS — Z12.31 ENCOUNTER FOR SCREENING MAMMOGRAM FOR BREAST CANCER: ICD-10-CM

## 2018-10-22 DIAGNOSIS — Z00.00 WELL ADULT EXAM: Primary | ICD-10-CM

## 2018-10-22 DIAGNOSIS — E55.9 VITAMIN D DEFICIENCY: ICD-10-CM

## 2018-10-22 LAB
25(OH)D3 SERPL-MCNC: 32.4 NG/ML (ref 30–100)
CHOLEST SERPL-MCNC: 331 MG/DL (ref 50–200)
HDLC SERPL-MCNC: 132 MG/DL (ref 40–60)
LDLC SERPL CALC-MCNC: 173 MG/DL (ref 0–100)
NONHDLC SERPL-MCNC: 199 MG/DL
TRIGL SERPL-MCNC: 131 MG/DL

## 2018-10-22 PROCEDURE — 90682 RIV4 VACC RECOMBINANT DNA IM: CPT | Performed by: PHYSICIAN ASSISTANT

## 2018-10-22 PROCEDURE — 99396 PREV VISIT EST AGE 40-64: CPT | Performed by: PHYSICIAN ASSISTANT

## 2018-10-22 PROCEDURE — 90472 IMMUNIZATION ADMIN EACH ADD: CPT | Performed by: PHYSICIAN ASSISTANT

## 2018-10-22 PROCEDURE — 90471 IMMUNIZATION ADMIN: CPT | Performed by: PHYSICIAN ASSISTANT

## 2018-10-22 PROCEDURE — 80061 LIPID PANEL: CPT | Performed by: PSYCHIATRY & NEUROLOGY

## 2018-10-22 PROCEDURE — 82306 VITAMIN D 25 HYDROXY: CPT

## 2018-10-22 PROCEDURE — 36415 COLL VENOUS BLD VENIPUNCTURE: CPT | Performed by: PSYCHIATRY & NEUROLOGY

## 2018-10-22 PROCEDURE — 90715 TDAP VACCINE 7 YRS/> IM: CPT | Performed by: PHYSICIAN ASSISTANT

## 2018-10-22 RX ORDER — NABUMETONE 750 MG/1
750 TABLET, FILM COATED ORAL 2 TIMES DAILY
Qty: 60 TABLET | Refills: 1 | Status: SHIPPED | OUTPATIENT
Start: 2018-10-22

## 2018-10-22 RX ORDER — FEXOFENADINE HCL 180 MG/1
180 TABLET ORAL DAILY
Qty: 30 TABLET | Refills: 5 | Status: SHIPPED | OUTPATIENT
Start: 2018-10-22

## 2018-10-23 NOTE — ASSESSMENT & PLAN NOTE
Patient notes slight improvement with increasing gabapentin dose  She will continue to follow with Rheumatology as directed

## 2018-10-23 NOTE — ASSESSMENT & PLAN NOTE
We reviewed that her ongoing symptoms over last 2 months appear to be related to allergies  She was encouraged to try switching from Zyrtec to Guerraview and was also directed to start using her Nasalide nasal spray on a consistent daily basis  She should call if her symptoms are worsening or failing to improve

## 2018-10-24 ENCOUNTER — TELEPHONE (OUTPATIENT)
Dept: FAMILY MEDICINE CLINIC | Facility: CLINIC | Age: 53
End: 2018-10-24

## 2018-10-25 ENCOUNTER — TELEPHONE (OUTPATIENT)
Dept: FAMILY MEDICINE CLINIC | Facility: CLINIC | Age: 53
End: 2018-10-25

## 2018-10-29 ENCOUNTER — TELEPHONE (OUTPATIENT)
Dept: FAMILY MEDICINE CLINIC | Facility: CLINIC | Age: 53
End: 2018-10-29

## 2018-10-29 DIAGNOSIS — J32.9 SINUSITIS, UNSPECIFIED CHRONICITY, UNSPECIFIED LOCATION: Primary | ICD-10-CM

## 2018-10-29 RX ORDER — AMOXICILLIN AND CLAVULANATE POTASSIUM 875; 125 MG/1; MG/1
1 TABLET, FILM COATED ORAL EVERY 12 HOURS SCHEDULED
Qty: 20 TABLET | Refills: 0 | Status: SHIPPED | OUTPATIENT
Start: 2018-10-29 | End: 2018-11-08

## 2018-10-29 NOTE — TELEPHONE ENCOUNTER
Constantly coughing , prudencio at night  Nose is sore, she thinks her sinuses are infected  T- 100 5  Does not want to come back in
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
## 2018-11-30 ENCOUNTER — OFFICE VISIT (OUTPATIENT)
Dept: PSYCHIATRY | Facility: CLINIC | Age: 53
End: 2018-11-30
Payer: COMMERCIAL

## 2018-11-30 VITALS
BODY MASS INDEX: 28.7 KG/M2 | HEIGHT: 61 IN | DIASTOLIC BLOOD PRESSURE: 71 MMHG | SYSTOLIC BLOOD PRESSURE: 103 MMHG | WEIGHT: 152 LBS | HEART RATE: 99 BPM

## 2018-11-30 DIAGNOSIS — F33.2 MAJOR DEPRESSIVE DISORDER, RECURRENT, SEVERE WITHOUT PSYCHOTIC FEATURES (HCC): Primary | Chronic | ICD-10-CM

## 2018-11-30 DIAGNOSIS — G47.09 OTHER INSOMNIA: Chronic | ICD-10-CM

## 2018-11-30 DIAGNOSIS — G25.9 EXTRAPYRAMIDAL REACTION: Chronic | ICD-10-CM

## 2018-11-30 DIAGNOSIS — F41.0 PANIC DISORDER WITHOUT AGORAPHOBIA: Chronic | ICD-10-CM

## 2018-11-30 DIAGNOSIS — F43.12 POST-TRAUMATIC STRESS DISORDER, CHRONIC: Chronic | ICD-10-CM

## 2018-11-30 DIAGNOSIS — F41.1 GENERALIZED ANXIETY DISORDER: Chronic | ICD-10-CM

## 2018-11-30 PROCEDURE — 99213 OFFICE O/P EST LOW 20 MIN: CPT | Performed by: PSYCHIATRY & NEUROLOGY

## 2018-11-30 PROCEDURE — 90833 PSYTX W PT W E/M 30 MIN: CPT | Performed by: PSYCHIATRY & NEUROLOGY

## 2018-11-30 RX ORDER — BENZTROPINE MESYLATE 0.5 MG/1
0.5 TABLET ORAL 2 TIMES DAILY PRN
Qty: 60 TABLET | Refills: 3 | Status: SHIPPED | OUTPATIENT
Start: 2018-11-30 | End: 2019-03-08 | Stop reason: SDUPTHER

## 2018-11-30 RX ORDER — TRAZODONE HYDROCHLORIDE 50 MG/1
50 TABLET ORAL
Qty: 30 TABLET | Refills: 3 | Status: SHIPPED | OUTPATIENT
Start: 2018-11-30 | End: 2019-03-08 | Stop reason: SDUPTHER

## 2018-11-30 RX ORDER — ARIPIPRAZOLE 2 MG/1
2 TABLET ORAL
Qty: 30 TABLET | Refills: 3 | Status: SHIPPED | OUTPATIENT
Start: 2018-11-30 | End: 2019-03-08 | Stop reason: SDUPTHER

## 2018-11-30 RX ORDER — DULOXETIN HYDROCHLORIDE 60 MG/1
120 CAPSULE, DELAYED RELEASE ORAL DAILY
Qty: 60 CAPSULE | Refills: 3 | Status: SHIPPED | OUTPATIENT
Start: 2018-11-30 | End: 2019-03-08 | Stop reason: SDUPTHER

## 2018-11-30 RX ORDER — CLONAZEPAM 0.5 MG/1
0.5 TABLET ORAL 3 TIMES DAILY
Qty: 90 TABLET | Refills: 3 | Status: SHIPPED | OUTPATIENT
Start: 2018-11-30 | End: 2019-03-08 | Stop reason: SDUPTHER

## 2018-12-03 ENCOUNTER — TELEPHONE (OUTPATIENT)
Dept: BEHAVIORAL/MENTAL HEALTH CLINIC | Facility: CLINIC | Age: 53
End: 2018-12-03

## 2018-12-11 ENCOUNTER — TELEPHONE (OUTPATIENT)
Dept: PSYCHIATRY | Facility: CLINIC | Age: 53
End: 2018-12-11

## 2018-12-11 ENCOUNTER — OFFICE VISIT (OUTPATIENT)
Dept: BEHAVIORAL/MENTAL HEALTH CLINIC | Facility: CLINIC | Age: 53
End: 2018-12-11
Payer: COMMERCIAL

## 2018-12-11 DIAGNOSIS — F41.0 PANIC DISORDER WITHOUT AGORAPHOBIA: ICD-10-CM

## 2018-12-11 DIAGNOSIS — F33.2 MAJOR DEPRESSIVE DISORDER, RECURRENT, SEVERE WITHOUT PSYCHOTIC FEATURES (HCC): Chronic | ICD-10-CM

## 2018-12-11 DIAGNOSIS — F33.2 SEVERE RECURRENT MAJOR DEPRESSION WITHOUT PSYCHOTIC FEATURES (HCC): ICD-10-CM

## 2018-12-11 DIAGNOSIS — F41.1 GENERALIZED ANXIETY DISORDER: Primary | ICD-10-CM

## 2018-12-11 DIAGNOSIS — F43.12 POST-TRAUMATIC STRESS DISORDER, CHRONIC: ICD-10-CM

## 2018-12-11 PROCEDURE — 90834 PSYTX W PT 45 MINUTES: CPT | Performed by: SOCIAL WORKER
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
## 2018-12-11 NOTE — TELEPHONE ENCOUNTER
Tanisha Iniguez requested that you give her a call regarding Munira-specifically how you think she could help her  She asked that you call the home number [phone]

## 2018-12-19 ENCOUNTER — TELEPHONE (OUTPATIENT)
Dept: BEHAVIORAL/MENTAL HEALTH CLINIC | Facility: CLINIC | Age: 53
End: 2018-12-19

## 2019-01-17 ENCOUNTER — DOCUMENTATION (OUTPATIENT)
Dept: PSYCHIATRY | Facility: CLINIC | Age: 54
End: 2019-01-17

## 2019-02-21 ENCOUNTER — DOCUMENTATION (OUTPATIENT)
Dept: BEHAVIORAL/MENTAL HEALTH CLINIC | Facility: CLINIC | Age: 54
End: 2019-02-21

## 2019-02-21 ENCOUNTER — TELEPHONE (OUTPATIENT)
Dept: BEHAVIORAL/MENTAL HEALTH CLINIC | Facility: CLINIC | Age: 54
End: 2019-02-21

## 2019-02-21 NOTE — PROGRESS NOTES
Treatment Plan Tracking    # 1Treatment Plan not completed within required time limits due to: unable to update txt plan for today 2/21/19, as she cancelled her ind psych appt for today at 2 PM due to her report of illness; Gerhardt Sep

## 2019-02-28 ENCOUNTER — OFFICE VISIT (OUTPATIENT)
Dept: BEHAVIORAL/MENTAL HEALTH CLINIC | Facility: CLINIC | Age: 54
End: 2019-02-28
Payer: COMMERCIAL

## 2019-02-28 ENCOUNTER — DOCUMENTATION (OUTPATIENT)
Dept: BEHAVIORAL/MENTAL HEALTH CLINIC | Facility: CLINIC | Age: 54
End: 2019-02-28

## 2019-02-28 DIAGNOSIS — F41.0 PANIC DISORDER WITHOUT AGORAPHOBIA: Primary | ICD-10-CM

## 2019-02-28 DIAGNOSIS — F41.1 GENERALIZED ANXIETY DISORDER: ICD-10-CM

## 2019-02-28 DIAGNOSIS — F33.2 MAJOR DEPRESSIVE DISORDER, RECURRENT, SEVERE WITHOUT PSYCHOTIC FEATURES (HCC): Chronic | ICD-10-CM

## 2019-02-28 DIAGNOSIS — F43.12 POST-TRAUMATIC STRESS DISORDER, CHRONIC: ICD-10-CM

## 2019-02-28 DIAGNOSIS — F33.2 SEVERE RECURRENT MAJOR DEPRESSION WITHOUT PSYCHOTIC FEATURES (HCC): ICD-10-CM

## 2019-02-28 PROCEDURE — 90834 PSYTX W PT 45 MINUTES: CPT | Performed by: SOCIAL WORKER

## 2019-02-28 NOTE — PROGRESS NOTES
Treatment Plan Tracking    # 1Treatment Plan not completed within required time limits due to: did not schedule within the four month time frame required to update her txt plan;

## 2019-02-28 NOTE — PSYCH
Psychotherapy Provided: Individual Psychotherapy 45 minutes will be moving in with my best girlfriend at the end of March (will remain in [place], [place]); "Things were always jaclyn with me and my daughter  It is time to make some changes  " states her 9year old grandson does not want her to move out of his and his mother's residence; states her grandson does very well in school and he is strong in [place] and "a good vocabulary;" states had been living with her daughter one year effective, December, 2018; states her daughter has been "wonderful and nice" since Shai Chinchilla has informed her of her plans to move out during March; states feels "very positive and relaxed about it (the move) and I am anticipating positive things;" She noted the need to maintain her distance with her family to include her daughter/grandson; "I have to live my life "regarding her current medication regimen she noted things are "ok " adding there are some of the side effects (ex , "all day" headaches or "for days;" reports ongoing fluctuating appetite;  Reports the stress level is a key factor in her pain level; When in a higher level of pain remains a little more to herself  She noted with the  "quiet" weather, she is not experiencing as much pain  Discussed/reviewed boundaries and the importance of self-care regarding her own sense of empowerment; A: presents as positive in her anticipation of moving in with a close friend; She reiterated, It is time " She, also, commented on looking forward to helping her friend with her three grandchildren  P: (G#1 ) will continue to be mindful of self-care and boundaries;         Length of time in session: 45 minutes, follow up in 1 month  Goals addressed in session: Goal 1     Pain:      moderate to severe    6 5 - 7    Current suicide risk : [address]: Diagnosis and Treatment Plan explained to Claudine Broussard relates understanding diagnosis and is agreeable to Treatment Plan  Yes

## 2019-02-28 NOTE — PROGRESS NOTES
Sandra Goldbergsay  1965       Date of Initial Treatment Plan: 10/12/16  Date of Current Treatment Plan: 02/28/19    Treatment Plan Number 8    Strengths/Personal Resources for Self Care: "hard working; organized, compulsive, compulsive tendencies; sort of a perfectionist;"    Diagnosis:   F43 12, F41 0, F41 1, F33 2    Area of Needs: anxiousness, panic attacks; Long Term Goal 1: AI want to continue to effectively manage the anxiety  Target Date: 6/28/19  Completion Date:n/a          Short Term Objectives for Goal 1: AI want to continue to manage the boundaries regarding my grandson and my friend's grandchildren  B   I want to continue to ahve realistic expectations of myself regarding for what I am/am not responsible in my life  ("having less stress")  C  I want to continue to be more mindful of the importance of taking care of myself  D  I will continue to be more mindful of using effective communication strategies  E  I will continue to effectively manage the panic attacks  Target Date: 6/2/8/19  Completion Date: n/a    Long Term Goal 2: I want to continue to grow in wisdom concerning my choices (effective decision making and laerning from incorrect decisions)  Target Date: 6/28/19  Completion Date: N/A    Short Term Objectives for Goal 2: AI will review my values  B  I will review my affirmations  C  I will reivew my strengths/assets/qualities  D  I will understand that happiness is being sure of one's self and knowing that, at times, with one's uniqueness one may feel lonely at times  E With the finalization of the divorce, I want to feel comfortable with myself again and be more consistent about it       Target Date: 6/28/19  Completion Date: n/a           GOAL 1: Modality: Individual 2x per month   Completion Date n/a and The person(s) responsible for carrying out the plan is  Munira     GOAL 2: Modality: Individual 2x per month   Completion Date n/a and The person(s) responsible for carrying out the plan is  [address]: Diagnosis and Treatment Plan explained to [place] Lies relates understanding diagnosis and is agreeable to Treatment Plan    yes      Client Comments : Please share your thoughts, feelings, need and/or experiences regarding your treatment plan:       __________________________________________________________________    __________________________________________________________________    __________________________________________________________________    __________________________________________________________________    _______________________________________                Patient signature, Date Time: __________________________________________             Physician cosigner signature, Date, Time: ________________________________

## 2019-03-04 ENCOUNTER — TELEPHONE (OUTPATIENT)
Dept: FAMILY MEDICINE CLINIC | Facility: CLINIC | Age: 54
End: 2019-03-04
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
## 2019-03-04 NOTE — TELEPHONE ENCOUNTER
Has watery diarrhea today, headache ,slight  sorethroat, no fever, no nausea, no vomiting  Advised gatorade , small amounts frequently, BRAT diet, peptobismal or immodium if needed  if not improving call for appt

## 2019-03-08 ENCOUNTER — OFFICE VISIT (OUTPATIENT)
Dept: PSYCHIATRY | Facility: CLINIC | Age: 54
End: 2019-03-08
Payer: COMMERCIAL

## 2019-03-08 VITALS
BODY MASS INDEX: 29.64 KG/M2 | HEIGHT: 61 IN | WEIGHT: 157 LBS | SYSTOLIC BLOOD PRESSURE: 111 MMHG | HEART RATE: 82 BPM | DIASTOLIC BLOOD PRESSURE: 74 MMHG

## 2019-03-08 DIAGNOSIS — F41.1 GENERALIZED ANXIETY DISORDER: Chronic | ICD-10-CM

## 2019-03-08 DIAGNOSIS — F41.0 PANIC DISORDER WITHOUT AGORAPHOBIA: Chronic | ICD-10-CM

## 2019-03-08 DIAGNOSIS — G47.09 OTHER INSOMNIA: Chronic | ICD-10-CM

## 2019-03-08 DIAGNOSIS — G25.9 EXTRAPYRAMIDAL REACTION: Chronic | ICD-10-CM

## 2019-03-08 DIAGNOSIS — F33.2 MAJOR DEPRESSIVE DISORDER, RECURRENT, SEVERE WITHOUT PSYCHOTIC FEATURES (HCC): Primary | Chronic | ICD-10-CM

## 2019-03-08 DIAGNOSIS — F43.12 POST-TRAUMATIC STRESS DISORDER, CHRONIC: Chronic | ICD-10-CM

## 2019-03-08 PROCEDURE — 90833 PSYTX W PT W E/M 30 MIN: CPT | Performed by: PSYCHIATRY & NEUROLOGY

## 2019-03-08 PROCEDURE — 99213 OFFICE O/P EST LOW 20 MIN: CPT | Performed by: PSYCHIATRY & NEUROLOGY

## 2019-03-08 RX ORDER — DULOXETIN HYDROCHLORIDE 60 MG/1
120 CAPSULE, DELAYED RELEASE ORAL DAILY
Qty: 60 CAPSULE | Refills: 3 | Status: SHIPPED | OUTPATIENT
Start: 2019-03-08 | End: 2019-05-16 | Stop reason: SDUPTHER

## 2019-03-08 RX ORDER — TRAZODONE HYDROCHLORIDE 100 MG/1
100 TABLET ORAL
Qty: 30 TABLET | Refills: 3 | Status: SHIPPED | OUTPATIENT
Start: 2019-03-08 | End: 2019-05-16 | Stop reason: SDUPTHER

## 2019-03-08 RX ORDER — BENZTROPINE MESYLATE 0.5 MG/1
0.5 TABLET ORAL 2 TIMES DAILY PRN
Qty: 60 TABLET | Refills: 3 | Status: SHIPPED | OUTPATIENT
Start: 2019-03-08 | End: 2019-05-16 | Stop reason: SDUPTHER

## 2019-03-08 RX ORDER — ARIPIPRAZOLE 5 MG/1
5 TABLET ORAL
Qty: 30 TABLET | Refills: 3 | Status: SHIPPED | OUTPATIENT
Start: 2019-03-08 | End: 2019-05-16 | Stop reason: SDUPTHER

## 2019-03-08 RX ORDER — CLONAZEPAM 0.5 MG/1
0.5 TABLET ORAL 3 TIMES DAILY
Qty: 90 TABLET | Refills: 3 | Status: SHIPPED | OUTPATIENT
Start: 2019-03-30 | End: 2019-05-16 | Stop reason: SDUPTHER

## 2019-04-01 ENCOUNTER — TELEPHONE (OUTPATIENT)
Dept: FAMILY MEDICINE CLINIC | Facility: CLINIC | Age: 54
End: 2019-04-01

## 2019-04-01 DIAGNOSIS — Z12.11 ENCOUNTER FOR SCREENING COLONOSCOPY: Primary | ICD-10-CM

## 2019-04-02 LAB — HBA1C MFR BLD HPLC: 5.4 %

## 2019-04-26 ENCOUNTER — TRANSCRIBE ORDERS (OUTPATIENT)
Dept: PHYSICAL THERAPY | Facility: CLINIC | Age: 54
End: 2019-04-26

## 2019-04-26 ENCOUNTER — EVALUATION (OUTPATIENT)
Dept: PHYSICAL THERAPY | Facility: CLINIC | Age: 54
End: 2019-04-26
Payer: COMMERCIAL

## 2019-04-26 DIAGNOSIS — M54.5 LOW BACK PAIN, UNSPECIFIED BACK PAIN LATERALITY, UNSPECIFIED CHRONICITY, WITH SCIATICA PRESENCE UNSPECIFIED: Primary | ICD-10-CM

## 2019-04-26 PROCEDURE — 97162 PT EVAL MOD COMPLEX 30 MIN: CPT

## 2019-05-03 ENCOUNTER — OFFICE VISIT (OUTPATIENT)
Dept: PHYSICAL THERAPY | Facility: CLINIC | Age: 54
End: 2019-05-03
Payer: COMMERCIAL

## 2019-05-03 DIAGNOSIS — M54.5 LOW BACK PAIN, UNSPECIFIED BACK PAIN LATERALITY, UNSPECIFIED CHRONICITY, WITH SCIATICA PRESENCE UNSPECIFIED: Primary | ICD-10-CM

## 2019-05-03 PROCEDURE — 97140 MANUAL THERAPY 1/> REGIONS: CPT

## 2019-05-03 PROCEDURE — 97110 THERAPEUTIC EXERCISES: CPT

## 2019-05-07 ENCOUNTER — TELEPHONE (OUTPATIENT)
Dept: BEHAVIORAL/MENTAL HEALTH CLINIC | Facility: CLINIC | Age: 54
End: 2019-05-07

## 2019-05-10 ENCOUNTER — APPOINTMENT (OUTPATIENT)
Dept: PHYSICAL THERAPY | Facility: CLINIC | Age: 54
End: 2019-05-10
Payer: COMMERCIAL

## 2019-05-10 ENCOUNTER — OFFICE VISIT (OUTPATIENT)
Dept: PHYSICAL THERAPY | Facility: CLINIC | Age: 54
End: 2019-05-10
Payer: COMMERCIAL

## 2019-05-10 DIAGNOSIS — M54.5 LOW BACK PAIN, UNSPECIFIED BACK PAIN LATERALITY, UNSPECIFIED CHRONICITY, WITH SCIATICA PRESENCE UNSPECIFIED: Primary | ICD-10-CM

## 2019-05-10 PROCEDURE — 97140 MANUAL THERAPY 1/> REGIONS: CPT

## 2019-05-10 PROCEDURE — 97110 THERAPEUTIC EXERCISES: CPT

## 2019-05-13 ENCOUNTER — TELEPHONE (OUTPATIENT)
Dept: PSYCHIATRY | Facility: CLINIC | Age: 54
End: 2019-05-13

## 2019-05-16 ENCOUNTER — OFFICE VISIT (OUTPATIENT)
Dept: PSYCHIATRY | Facility: CLINIC | Age: 54
End: 2019-05-16
Payer: COMMERCIAL

## 2019-05-16 VITALS
DIASTOLIC BLOOD PRESSURE: 66 MMHG | WEIGHT: 155 LBS | HEIGHT: 61 IN | HEART RATE: 90 BPM | BODY MASS INDEX: 29.27 KG/M2 | SYSTOLIC BLOOD PRESSURE: 96 MMHG

## 2019-05-16 DIAGNOSIS — G25.9 EXTRAPYRAMIDAL REACTION: Chronic | ICD-10-CM

## 2019-05-16 DIAGNOSIS — F41.1 GENERALIZED ANXIETY DISORDER: Chronic | ICD-10-CM

## 2019-05-16 DIAGNOSIS — G47.09 OTHER INSOMNIA: Chronic | ICD-10-CM

## 2019-05-16 DIAGNOSIS — F41.0 PANIC DISORDER WITHOUT AGORAPHOBIA: Chronic | ICD-10-CM

## 2019-05-16 DIAGNOSIS — F33.2 MAJOR DEPRESSIVE DISORDER, RECURRENT, SEVERE WITHOUT PSYCHOTIC FEATURES (HCC): Primary | Chronic | ICD-10-CM

## 2019-05-16 DIAGNOSIS — F43.12 POST-TRAUMATIC STRESS DISORDER, CHRONIC: Chronic | ICD-10-CM

## 2019-05-16 PROCEDURE — 90833 PSYTX W PT W E/M 30 MIN: CPT | Performed by: PSYCHIATRY & NEUROLOGY

## 2019-05-16 PROCEDURE — 99213 OFFICE O/P EST LOW 20 MIN: CPT | Performed by: PSYCHIATRY & NEUROLOGY

## 2019-05-16 RX ORDER — TRAZODONE HYDROCHLORIDE 100 MG/1
100 TABLET ORAL
Qty: 30 TABLET | Refills: 3 | Status: SHIPPED | OUTPATIENT
Start: 2019-05-16 | End: 2019-09-06 | Stop reason: SDUPTHER

## 2019-05-16 RX ORDER — ARIPIPRAZOLE 5 MG/1
5 TABLET ORAL
Qty: 30 TABLET | Refills: 3 | Status: SHIPPED | OUTPATIENT
Start: 2019-05-16 | End: 2019-09-06 | Stop reason: SDUPTHER

## 2019-05-16 RX ORDER — DULOXETIN HYDROCHLORIDE 60 MG/1
120 CAPSULE, DELAYED RELEASE ORAL DAILY
Qty: 60 CAPSULE | Refills: 3 | Status: SHIPPED | OUTPATIENT
Start: 2019-05-16 | End: 2019-09-06 | Stop reason: SDUPTHER

## 2019-05-16 RX ORDER — CHOLECALCIFEROL (VITAMIN D3) 1250 MCG
CAPSULE ORAL WEEKLY
COMMUNITY
End: 2019-11-01 | Stop reason: SDUPTHER

## 2019-05-16 RX ORDER — BENZTROPINE MESYLATE 0.5 MG/1
0.5 TABLET ORAL 2 TIMES DAILY PRN
Qty: 60 TABLET | Refills: 3 | Status: SHIPPED | OUTPATIENT
Start: 2019-05-16 | End: 2019-09-06 | Stop reason: SDUPTHER

## 2019-05-16 RX ORDER — CLONAZEPAM 0.5 MG/1
0.5 TABLET ORAL 3 TIMES DAILY
Qty: 90 TABLET | Refills: 3 | Status: SHIPPED | OUTPATIENT
Start: 2019-07-28 | End: 2019-11-01 | Stop reason: SDUPTHER

## 2019-05-17 ENCOUNTER — APPOINTMENT (OUTPATIENT)
Dept: PHYSICAL THERAPY | Facility: CLINIC | Age: 54
End: 2019-05-17
Payer: COMMERCIAL

## 2019-05-21 ENCOUNTER — APPOINTMENT (OUTPATIENT)
Dept: LAB | Facility: HOSPITAL | Age: 54
End: 2019-05-21
Payer: COMMERCIAL

## 2019-05-21 DIAGNOSIS — Z12.11 ENCOUNTER FOR SCREENING COLONOSCOPY: ICD-10-CM

## 2019-05-21 LAB — HEMOCCULT STL QL IA: NEGATIVE

## 2019-05-21 PROCEDURE — G0328 FECAL BLOOD SCRN IMMUNOASSAY: HCPCS

## 2019-05-24 ENCOUNTER — OFFICE VISIT (OUTPATIENT)
Dept: PHYSICAL THERAPY | Facility: CLINIC | Age: 54
End: 2019-05-24
Payer: COMMERCIAL

## 2019-05-24 DIAGNOSIS — M54.5 LOW BACK PAIN, UNSPECIFIED BACK PAIN LATERALITY, UNSPECIFIED CHRONICITY, WITH SCIATICA PRESENCE UNSPECIFIED: Primary | ICD-10-CM

## 2019-05-24 PROCEDURE — 97110 THERAPEUTIC EXERCISES: CPT

## 2019-05-24 PROCEDURE — 97140 MANUAL THERAPY 1/> REGIONS: CPT

## 2019-05-30 ENCOUNTER — TELEPHONE (OUTPATIENT)
Dept: BEHAVIORAL/MENTAL HEALTH CLINIC | Facility: CLINIC | Age: 54
End: 2019-05-30

## 2019-05-31 ENCOUNTER — OFFICE VISIT (OUTPATIENT)
Dept: PHYSICAL THERAPY | Facility: CLINIC | Age: 54
End: 2019-05-31
Payer: COMMERCIAL

## 2019-05-31 DIAGNOSIS — M54.5 LOW BACK PAIN, UNSPECIFIED BACK PAIN LATERALITY, UNSPECIFIED CHRONICITY, WITH SCIATICA PRESENCE UNSPECIFIED: Primary | ICD-10-CM

## 2019-05-31 PROCEDURE — 97110 THERAPEUTIC EXERCISES: CPT | Performed by: PHYSICAL THERAPIST

## 2019-05-31 PROCEDURE — 97530 THERAPEUTIC ACTIVITIES: CPT | Performed by: PHYSICAL THERAPIST

## 2019-05-31 PROCEDURE — 97112 NEUROMUSCULAR REEDUCATION: CPT | Performed by: PHYSICAL THERAPIST

## 2019-05-31 PROCEDURE — 97140 MANUAL THERAPY 1/> REGIONS: CPT | Performed by: PHYSICAL THERAPIST

## 2019-06-07 ENCOUNTER — APPOINTMENT (OUTPATIENT)
Dept: PHYSICAL THERAPY | Facility: CLINIC | Age: 54
End: 2019-06-07
Payer: COMMERCIAL

## 2019-06-14 ENCOUNTER — APPOINTMENT (OUTPATIENT)
Dept: PHYSICAL THERAPY | Facility: CLINIC | Age: 54
End: 2019-06-14
Payer: COMMERCIAL

## 2019-06-20 ENCOUNTER — TELEPHONE (OUTPATIENT)
Dept: BEHAVIORAL/MENTAL HEALTH CLINIC | Facility: CLINIC | Age: 54
End: 2019-06-20

## 2019-06-20 ENCOUNTER — DOCUMENTATION (OUTPATIENT)
Dept: BEHAVIORAL/MENTAL HEALTH CLINIC | Facility: CLINIC | Age: 54
End: 2019-06-20

## 2019-06-24 ENCOUNTER — TELEPHONE (OUTPATIENT)
Dept: BEHAVIORAL/MENTAL HEALTH CLINIC | Facility: CLINIC | Age: 54
End: 2019-06-24

## 2019-06-25 ENCOUNTER — DOCUMENTATION (OUTPATIENT)
Dept: BEHAVIORAL/MENTAL HEALTH CLINIC | Facility: CLINIC | Age: 54
End: 2019-06-25

## 2019-06-25 ENCOUNTER — TELEPHONE (OUTPATIENT)
Dept: FAMILY MEDICINE CLINIC | Facility: CLINIC | Age: 54
End: 2019-06-25

## 2019-06-25 ENCOUNTER — TELEPHONE (OUTPATIENT)
Dept: BEHAVIORAL/MENTAL HEALTH CLINIC | Facility: CLINIC | Age: 54
End: 2019-06-25

## 2019-06-25 ENCOUNTER — SOCIAL WORK (OUTPATIENT)
Dept: BEHAVIORAL/MENTAL HEALTH CLINIC | Facility: CLINIC | Age: 54
End: 2019-06-25
Payer: COMMERCIAL

## 2019-06-25 DIAGNOSIS — F41.0 PANIC DISORDER WITHOUT AGORAPHOBIA: ICD-10-CM

## 2019-06-25 DIAGNOSIS — F41.1 GENERALIZED ANXIETY DISORDER: Primary | ICD-10-CM

## 2019-06-25 DIAGNOSIS — F33.2 SEVERE RECURRENT MAJOR DEPRESSION WITHOUT PSYCHOTIC FEATURES (HCC): ICD-10-CM

## 2019-06-25 DIAGNOSIS — F43.12 CHRONIC POSTTRAUMATIC STRESS DISORDER: ICD-10-CM

## 2019-06-25 PROCEDURE — 90834 PSYTX W PT 45 MINUTES: CPT | Performed by: SOCIAL WORKER

## 2019-06-25 NOTE — TELEPHONE ENCOUNTER
Munira forgot to tell you in the session that she has a hearing date for her disabiulity   It's on November 11th

## 2019-06-30 NOTE — PROGRESS NOTES
[place] [facility]  FAMILY PRACTICE OFFICE VISIT       NAME: Marquis Baker  AGE: 47 y o  SEX: female       : 1965        MRN: [id number]    DATE: 2019  TIME: 4:05 PM    Assessment and Plan     Problem List Items Addressed This Visit        Other    Overweight (BMI 25 0-29 9) - Primary     Patient was encouraged to work on improving diet further  Continue lean meats and a lot of fruits/vegetables  Try to limit carb intake, such as bread  Try to drink only water (eliminating Gatorade and any other caloric beverages)  She was encouraged to try My [employer] bette on her phone for logging food and exercise  Call if not improving  She then might consider Weight Management referral  She does not really qualify for weight loss medications since her BMI is not in the obesity range at this time  (She also does not appear to have coverage for these meds )    BMI Counseling: Body mass index is 29 33 kg/m²  Discussed the patient's BMI with her  The BMI is above average  BMI counseling and education was provided to the patient  Nutrition recommendations include decreasing overall calorie intake  Exercise recommendations include moderate aerobic physical activity for 150 minutes/week  Chief Complaint     Chief Complaint   Patient presents with    Weight Loss       History of Present Illness   Hillis Bamberger [place] is a 47y o -year-old female who presents for interested in weight loss medication  Patient called last week requesting a script for orlistat for weight loss  She was asked to come in for evaluation prior to starting her on any weight loss medication  She notes that she has a lot of salads and tried to increase exercise (goes for 2 walks a day for 5-10 minutes)  She has been doing PT  She is drinking a lot of water  She notes that she has gained about 10 pounds since last year   She saw nutritionist in past as part of group sessions with psychiatry but no individual sessions or personalized plans  Review of Systems   Review of Systems   Constitutional: Positive for unexpected weight change  Active Problem List     Patient Active Problem List   Diagnosis    Allergic rhinitis    Costochondritis    Fibromyalgia    Generalized anxiety disorder    Severe recurrent major depression without psychotic features ([address] )    Menopause    Migraine headache    Other insomnia    Panic disorder without agoraphobia    Chronic posttraumatic stress disorder    Pulmonary nodule    Sacroiliitis (HCC)    Vitamin D deficiency    Extrapyramidal reaction    Overweight (BMI 25 0-29  9)         Past Medical History:  Past Medical History:   Diagnosis Date    Abnormal chest x-ray     last assessed: 2/6/2015    Anxiety     Chronic pain     Costochondritis     Depression     Fibromyalgia     Frequent headaches     Head injury     Herpetic vulvovaginitis 11/13/2009    Iron deficiency anemia     resolved    PTSD (post-traumatic stress disorder)     Pulmonary nodule     Sacroiliitis (HCC)     Sciatica        Past Surgical History:  Past Surgical History:   Procedure Laterality Date    ABDOMINAL SURGERY      to remove abscess from abdomen after tubal ligation    CHOLECYSTECTOMY      DENTAL SURGERY      GALLBLADDER SURGERY      HYSTERECTOMY      vaginal    SALPINGOOPHORECTOMY      TOTAL ABDOMINAL HYSTERECTOMY      TUBAL LIGATION  1991    with abcjeremie Batista  2003       Family History:  Family History   Problem Relation Age of Onset    Alcohol abuse Brother     Drug abuse Brother     Bipolar disorder Daughter     Hypertension Mother     Heart attack Father     Alcohol abuse Father     Hypertension Sister     Migraines Sister     Heart attack Maternal Grandmother     Prostate cancer Maternal Grandfather     Colon polyps Sister        Social History:  Social History     Socioeconomic History    Marital status:  Spouse name: Not on file    Number of children: 2    Years of education: Vocational school for Medical Assistant    Highest education level: Associate degree: occupational, technical, or vocational program   Occupational History    Occupation: CNA   Social Needs    Financial resource strain: Not hard at all   [place]-Latoya insecurity:     Worry: Never true     Inability: Never true   Vpon needs:     Medical: No     Non-medical: No   Tobacco Use    Smoking status: Never Smoker    Smokeless tobacco: Never Used   Substance and Sexual Activity    Alcohol use: Yes     Frequency: Monthly or less     Drinks per session: 1 or 2     Binge frequency: Never     Comment: Social alcohol use: 1 glass of wine on special occasions    Drug use: No    Sexual activity: Not Currently   Lifestyle    Physical activity:     Days per week: 2 days     Minutes per session: 10 min    Stress:  To some extent   Relationships    Social connections:     Talks on phone: Once a week     Gets together: Once a week     Attends Yarsanism service: Never     Active member of club or organization: No     Attends meetings of clubs or organizations: Never     Relationship status:     Intimate partner violence:     Fear of current or ex partner: No     Emotionally abused: No     Physically abused: No     Forced sexual activity: No   Other Topics Concern    Not on file   Social History Narrative    Education: high school graduate and vocational school for Medical Assistant    Learning Disabilities: none    Marital History:     Children: 1 adult son, 1 adult daughter    Living Arrangement: lives in home with friend    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, currently unemployed, applied for disability    Functioning Relationships: poor relationship with     Legal History: none     History: None       Objective     Vitals:    07/05/19 1351   BP: 106/64   BP Location: Left arm   Patient Position: Sitting   Pulse: 84   SpO2: 99%   Weight: 71 6 kg (157 lb 12 8 oz)   Height: 5' 1 5" (1 562 m)     Wt Readings from Last 3 Encounters:   07/05/19 71 6 kg (157 lb 12 8 oz)   05/16/19 70 3 kg (155 lb)   03/08/19 71 2 kg (157 lb)       Physical Exam   Constitutional: She appears well-developed and well-nourished  No distress  Neck: Neck supple  No thyromegaly present  Cardiovascular: Normal rate, regular rhythm, normal heart sounds and intact distal pulses  No murmur heard  Pulmonary/Chest: Effort normal and breath sounds normal  She has no wheezes  She has no rales  Lymphadenopathy:     She has no cervical adenopathy  Skin: Skin is warm and dry  Psychiatric: She has a normal mood and affect  Vitals reviewed        Pertinent Laboratory/Diagnostic Studies:  Lab Results   Component Value Date    GLUCOSE 86 04/13/2015    BUN 11 04/23/2018    CREATININE 0 79 04/23/2018    CALCIUM 9 4 04/23/2018     10/03/2017    K 4 0 04/23/2018    CO2 27 04/23/2018     04/23/2018     Lab Results   Component Value Date    ALT 12 04/23/2018    AST 16 04/23/2018    ALKPHOS 65 04/23/2018    BILITOT 0 6 10/03/2017       Lab Results   Component Value Date    WBC 4 9 04/23/2018    HGB 12 6 (L) 04/23/2018    HCT 37 3 (L) 04/23/2018    MCV 82 5 04/23/2018     04/23/2018     Lab Results   Component Value Date    TRIG 131 10/22/2018     Lab Results   Component Value Date     (H) 10/22/2018     Lab Results   Component Value Date    LDLCALC 173 (H) 10/22/2018     Lab Results   Component Value Date    HGBA1C 5 4 04/02/2019       Results for orders placed or performed in visit on 05/21/19   Occult Bloood,Fecal Immunochemical   Result Value Ref Range    OCCULT BLD, FECAL IMMUNOLOGICAL Negative Negative           ALLERGIES:  Allergies   Allergen Reactions    Prednisone Shortness Of Breath and Other (See Comments)     Trouble sleeping and sweats    Dust Mite Extract     Pollen Extract     Tree Extract        Current Medications     Current Outpatient Medications   Medication Sig Dispense Refill    acyclovir (ZOVIRAX) 800 mg tablet Take 1 tablet by mouth 2 (two) times a day      ARIPiprazole (ABILIFY) 5 mg tablet Take 1 tablet (5 mg total) by mouth daily at bedtime for 120 days 30 tablet 3    benztropine (COGENTIN) 0 5 mg tablet Take 1 tablet (0 5 mg total) by mouth 2 (two) times a day as needed for tremors for up to 120 days 60 tablet 3    Cholecalciferol (VITAMIN D3) 93480 units CAPS Take by mouth once a week      [START ON 7/28/2019] clonazePAM (KlonoPIN) 0 5 mg tablet Take 1 tablet (0 5 mg total) by mouth 3 (three) times a day for 120 days To be filled on or after 7/28/19 90 tablet 3    DULoxetine (CYMBALTA) 60 mg delayed release capsule Take 2 capsules (120 mg total) by mouth daily for 120 days 60 capsule 3    estradiol (ESTRACE) 2 MG tablet Take 2 mg by mouth daily      fexofenadine (ALLEGRA) 180 MG tablet Take 1 tablet (180 mg total) by mouth daily 30 tablet 5    flunisolide (NASALIDE) 25 MCG/ACT (0 025%) SOLN Inhale 2 sprays every 12 (twelve) hours (Patient taking differently: Inhale 2 sprays every 12 (twelve) hours as needed ) 1 Bottle 3    gabapentin (NEURONTIN) 300 mg capsule Take 300 mg by mouth 3 (three) times a day        ibuprofen (MOTRIN) 400 mg tablet Take 400 mg by mouth 3 (three) times a day      nabumetone (RELAFEN) 750 mg tablet Take 1 tablet (750 mg total) by mouth 2 (two) times a day As needed for sciatic pain  DO NOT TAKE WITH IBUPROFEN  60 tablet 1    traZODone (DESYREL) 100 mg tablet Take 1 tablet (100 mg total) by mouth daily at bedtime as needed for sleep for up to 120 days 30 tablet 3     No current facility-administered medications for this visit            Health Maintenance     Health Maintenance   Topic Date Due    MAMMOGRAM  1965    CRC Screening: Colonoscopy  1965    BMI: Followup Plan  12/18/1984    PT PLAN OF CARE 05/26/2019    INFLUENZA VACCINE  07/01/2019    BMI: Adult  05/16/2020    DTaP,Tdap,and Td Vaccines (2 - Td) 10/22/2028    Pneumococcal Vaccine: 65+ Years (1 of 2 - PCV13) 12/18/2031    Pneumococcal Vaccine: Pediatrics (0 to 5 Years) and At-Risk Patients (6 to 59 Years)  Aged Out    HEPATITIS B VACCINES  Aged [employer] History   Administered Date(s) Administered    H1N1, All Formulations 11/11/2009    INFLUENZA 12/10/2006    Influenza Quadrivalent, 6-35 Months IM 10/18/2016    Influenza, recombinant, quadrivalent,injectable, preservative free 10/22/2018    Td (adult), adsorbed 03/06/1998    Tdap 10/22/2018       Leeann Maldonado PA-C  7/5/2019 4:05 PM  [facility]

## 2019-07-05 ENCOUNTER — OFFICE VISIT (OUTPATIENT)
Dept: FAMILY MEDICINE CLINIC | Facility: CLINIC | Age: 54
End: 2019-07-05
Payer: COMMERCIAL

## 2019-07-05 ENCOUNTER — APPOINTMENT (OUTPATIENT)
Dept: PHYSICAL THERAPY | Facility: CLINIC | Age: 54
End: 2019-07-05
Payer: COMMERCIAL

## 2019-07-05 VITALS
WEIGHT: 157.8 LBS | HEIGHT: 62 IN | OXYGEN SATURATION: 99 % | HEART RATE: 84 BPM | SYSTOLIC BLOOD PRESSURE: 106 MMHG | BODY MASS INDEX: 29.04 KG/M2 | DIASTOLIC BLOOD PRESSURE: 64 MMHG

## 2019-07-05 DIAGNOSIS — E66.3 OVERWEIGHT (BMI 25.0-29.9): Primary | ICD-10-CM

## 2019-07-05 PROCEDURE — 99213 OFFICE O/P EST LOW 20 MIN: CPT | Performed by: PHYSICIAN ASSISTANT

## 2019-07-05 NOTE — PATIENT INSTRUCTIONS
Overweight (BMI 25 0-29  9)  Patient was encouraged to work on improving diet further  Continue lean meats and a lot of fruits/vegetables  Try to limit carb intake, such as bread  Try to drink only water (eliminating Gatorade and any other caloric beverages)  She was encouraged to try My Fitness Pal bette on her phone for logging food and exercise  Call if not improving   She then might consider Weight Management referral

## 2019-07-05 NOTE — ASSESSMENT & PLAN NOTE
Patient was encouraged to work on improving diet further  Continue lean meats and a lot of fruits/vegetables  Try to limit carb intake, such as bread  Try to drink only water (eliminating Gatorade and any other caloric beverages)  She was encouraged to try [employer] bette on her phone for logging food and exercise  Call if not improving  She then might consider Weight Management referral  She does not really qualify for weight loss medications since her BMI is not in the obesity range at this time  (She also does not appear to have coverage for these meds )    BMI Counseling: Body mass index is 29 33 kg/m²  Discussed the patient's BMI with her  The BMI is above average  BMI counseling and education was provided to the patient  Nutrition recommendations include decreasing overall calorie intake  Exercise recommendations include moderate aerobic physical activity for 150 minutes/week

## 2019-07-12 ENCOUNTER — APPOINTMENT (OUTPATIENT)
Dept: PHYSICAL THERAPY | Facility: CLINIC | Age: 54
End: 2019-07-12
Payer: COMMERCIAL

## 2019-07-19 ENCOUNTER — TRANSCRIBE ORDERS (OUTPATIENT)
Dept: PHYSICAL THERAPY | Facility: CLINIC | Age: 54
End: 2019-07-19

## 2019-07-19 ENCOUNTER — EVALUATION (OUTPATIENT)
Dept: PHYSICAL THERAPY | Facility: CLINIC | Age: 54
End: 2019-07-19
Payer: COMMERCIAL

## 2019-07-19 DIAGNOSIS — M54.50 BILATERAL LOW BACK PAIN WITHOUT SCIATICA, UNSPECIFIED CHRONICITY: Primary | ICD-10-CM

## 2019-07-19 DIAGNOSIS — M54.50 LUMBAR SPINE PAIN: Primary | ICD-10-CM

## 2019-07-19 PROCEDURE — 97112 NEUROMUSCULAR REEDUCATION: CPT | Performed by: PHYSICAL THERAPIST

## 2019-07-19 PROCEDURE — 97110 THERAPEUTIC EXERCISES: CPT | Performed by: PHYSICAL THERAPIST

## 2019-07-19 NOTE — PROGRESS NOTES
PT Re-Evaluation     Today's date: 2019  Patient name: Malcolm Morales  : 1965  MRN: [id number]  Referring provider: Elyssa Chen PA-C  Dx:   Encounter Diagnosis     ICD-10-CM    1  Lumbar spine pain M54 5                   Assessment  Assessment details: Pt is a pleasant 47 y o  female returning to outpatient physical therapy with Lumbar spine pain  (primary encounter diagnosis) after 2 month hiatus since last treatment  Pt presents with pain, decreased range of motion, decreased strength, and decreased tolerance to activity  Due to chronic nature of symptoms, high levels of pain, and soft tissue sensitivity, patient displays signs/symptoms consistent with central sensitization dysfunction  Educated patient on chronic pain experience, difference between hurt & harm, and the multifactorial contributors to the pain experience  Educated and advised patient in relaxation techniques, regulating sleep schedule, and encouraged patient to maintain and gradually progress activity levels  Due to minimal change since last therapy session, will not be able to recommend further skilled care in clinic, however, encouraged patient to try implementing some of the discussed techniques  Will plan to call patient in 2-3 weeks to follow up  Also encouraged patient to schedule follow up appointments with PCP and rheumatologist for further consultations  Will hold therapy at present time  Impairments: abnormal coordination, abnormal or restricted ROM, activity intolerance, impaired physical strength, lacks appropriate home exercise program and pain with function  Understanding of Dx/Px/POC: fair   Prognosis: fair  Prognosis details: Pain attributed to fibromyalgia -- of chronic nature with global numbness and tingling symptoms throughout all extremities  Pt report trial of previous physical therapy sessions have not helped to completely resolve symptoms     Pt further reports that she had more favorable results with aquatic therapy compared to land based therapy  Goals  ST  Pt demo 25% increase in lumbar ROM in 3 weeks  2  Pt report 2 point decrease on VAS pain scale in 3 weeks   3  Per pt report, able to stand >15 minutes to tolerate washing dishes without pain in 3 weeks    LT  Pt demo independence with HEP upon discharge   2  Pt achieve anticipated FOTO score upon discharge  3  Pt report pain levels 0-3 while performing ADL's upon discharge  Plan  Patient would benefit from: PT eval and skilled physical therapy  Planned modality interventions: TENS, thermotherapy: hydrocollator packs and cryotherapy  Planned therapy interventions: joint mobilization, manual therapy, massage, neuromuscular re-education, patient education, therapeutic exercise, home exercise program and ADL training  Treatment plan discussed with: patient        Subjective Evaluation    History of Present Illness  Mechanism of injury: :Pt reports diagnosis of fibromyalgia with ongoing global back and B shoulder pain over the past few years  Pt states pain is most felt in low back with numbness and tingling into BLE when low back pain intensifies  Pt reports numbness and tingling occurs in all extremities  Pt reports hx of sciatica and describes sx as throbbing, numbness, tingling, and burning starting in low back and radiating down into B feet  At present time pt reports numbness just below the knee  Pt reports pain ranges from 6-8 over the past few weeks  Pt reports trials of land-based and aquatic physical therapy sessions approximately 10 years ago  Per pt report, aquatic therapy seemed to work better and provided relief for a few hours after session, however, land-based therapy seemed to make pain worse  Pt states aggravating factors include: sweeping, cleaning, standing to wash dishes, stress  Pt reports intermittent difficulty with amb stairs and even surfaces   Pt reports relieving factors include: massage, ice, heating pad, medications (naproxyn and gabapentin), and bed rest for up to a few days  Pt goals to improve mobility and ability to perform household chores  Pt reports frequent follow-up with orthopedic and family doctor when needed in addition to every 3-5 months     : Pt returns to therapy after 2-month hiatus, secondary to brother passing away and fibromyalgia  Reports no changes in symptoms since starting therapy  Reports she only ever has mild and temporary relief with therapy treatments or medications  States she experiences burning and throbbing throughout LEs, throughout back, which she reports is constant  Reports her pain becomes so intense some days that she cannot get out of bed  Denies change sin bowel or bladder function, however, reports weight gain due to craving, which she attributes to medications  Aside from constant symptoms, states standing or sitting long periods of time, sweeping, kneeling, or doing household chores aggravate symptoms  States she occasionally attempts stretches as prescribed in PT 2-3 months ago, however, denies relief  Patient attributes her symptoms to fibromylagia, for which she sees a rheumatologist  States she has follow up with her in September     Pain  Current pain ratin  At best pain ratin  At worst pain rating: 10  Location: B UT and low back into B LE  Quality: throbbing and burning (tingling and numbness)  Relieving factors: medications, heat and rest (gabapentin helps reduce pain, however, causes headaches and stomach aches)  Aggravating factors: standing (sweeping and completing household chores)  Progression: worsening      Diagnostic Tests    FCE comments: : Pt reports CT and X-ray scans completed approximately 2 years ago Treatments  Previous treatment: physical therapy and medication  Current treatment: medication  Patient Goals  Patient goals for therapy: decreased pain, increased motion and independence with ADLs/IADLs          Objective Palpation   Left   Hypertonic in the lumbar paraspinals  Tenderness of the gluteus richard, piriformis and quadratus lumborum  Right   Hypertonic in the lumbar paraspinals  Tenderness of the gluteus richard, piriformis and quadratus lumborum       Neurological Testing     Sensation     Lumbar   Left   Diminished: light touch    Right   Intact: light touch    Reflexes   Left   Patellar (L4): brisk (3+)  Achilles (S1): normal (2+)    Right   Patellar (L4): brisk (3+)  Achilles (S1): normal (2+)    Additional Neurological Details  4/26:  LOWER EXTREMITY DERMATOMES: Decreased left L3, L4, L5, S1, S2      Active Range of Motion     Lumbar   Flexion: 70 degrees  with pain  Extension: 15 degrees   Left lateral flexion: 20 degrees       Right lateral flexion: 25 degrees     Additional Active Range of Motion Details  LUMBAR AROM -  Flexion, extension, bilat lateral flexion measured with bubble inclinometer at L1; Rotation measured with goniometer and patient seated      Joint Play     Hypomobile: L1, L2, L3, L4 and L5   Mechanical Assessment    Cervical      Thoracic      Lumbar    Standing flexion: repeated movements   Pain location:no change  Pain intensity: better  Pain level: decreased  improvements into flexion ROM   Standing extension: repeated movements  Pain location: no change  Pain level: increased  Lying extension: repeated movements  Pain location: no change    Strength/Myotome Testing     Left Hip   Planes of Motion   Flexion: 4-  External rotation: 4  Internal rotation: 4-    Right Hip   Planes of Motion   Flexion: 4-  External rotation: 4  Internal rotation: 4-    Left Knee   Flexion: 4+  Extension: 4+    Right Knee   Flexion: 4+  Extension: 4    Left Ankle/Foot   Dorsiflexion: 5  Plantar flexion: 4+  Great toe extension: 5    Right Ankle/Foot   Dorsiflexion: 4+  Plantar flexion: 4  Great toe extension: 4+    Tests     Lumbar     Left   Negative passive SLR and slump test      Right   Positive slump test    Negative passive SLR       Left Pelvic Girdle/Sacrum   Positive: active SLR test      Right Pelvic Girdle/Sacrum   Negative: active SLR test      General Comments:      Lumbar Comments  4/26:   Pt report improvements in back pain with lumbar spine PA mobs          Precautions: depression, fibromyalgia, sciatica

## 2019-07-19 NOTE — LETTER
2019    Jeaneth Hood, [address] [facility] [address] [facility] [address]    Patient: Lenard Stanford   YOB: 1965   Date of Visit: 2019     Encounter Diagnosis     ICD-10-CM    1  Lumbar spine pain M54 5 PT plan of care cert/re-cert       Dear Dr Tmo Kimball:    Please review the attached Plan of Care from [address] [place] [address] recent visit  Please verify that you agree therapy should continue by signing the attached document and sending it back to our office  If you have any questions or concerns, please don't hesitate to call  Sincerely,    Valentin Rasmussen, PT      Referring Provider:      I certify that I have read the below Plan of Care and certify the need for these services furnished under this plan of treatment while under my care  Jeaneth Hood, [address] [facility] [address] [facility]  [address] [address] [address]: [phone]          PT Re-Evaluation     Today's date: 2019  Patient name: Lenard Stanford  : 1965  MRN: [id number]  Referring provider: Sangita Hoyt PA-C  Dx:   Encounter Diagnosis     ICD-10-CM    1  Lumbar spine pain M54 5                   Assessment  Assessment details: Pt is a pleasant 47 y o  female returning to outpatient physical therapy with Lumbar spine pain  (primary encounter diagnosis) after 2 month hiatus since last treatment  Pt presents with pain, decreased range of motion, decreased strength, and decreased tolerance to activity  Due to chronic nature of symptoms, high levels of pain, and soft tissue sensitivity, patient displays signs/symptoms consistent with central sensitization dysfunction  Educated patient on chronic pain experience, difference between hurt & harm, and the multifactorial contributors to the pain experience  Educated and advised patient in relaxation techniques, regulating sleep schedule, and encouraged patient to maintain and gradually progress activity levels   Due to minimal change since last therapy session, will not be able to recommend further skilled care in clinic, however, encouraged patient to try implementing some of the discussed techniques  Will plan to call patient in 2-3 weeks to follow up  Also encouraged patient to schedule follow up appointments with PCP and rheumatologist for further consultations  Will hold therapy at present time  Impairments: abnormal coordination, abnormal or restricted ROM, activity intolerance, impaired physical strength, lacks appropriate home exercise program and pain with function  Understanding of Dx/Px/POC: fair   Prognosis: fair  Prognosis details: Pain attributed to fibromyalgia -- of chronic nature with global numbness and tingling symptoms throughout all extremities  Pt report trial of previous physical therapy sessions have not helped to completely resolve symptoms  Pt further reports that she had more favorable results with aquatic therapy compared to land based therapy  Goals  ST  Pt demo 25% increase in lumbar ROM in 3 weeks  2  Pt report 2 point decrease on VAS pain scale in 3 weeks   3  Per pt report, able to stand >15 minutes to tolerate washing dishes without pain in 3 weeks    LT  Pt demo independence with HEP upon discharge   2  Pt achieve anticipated FOTO score upon discharge  3  Pt report pain levels 0-3 while performing ADL's upon discharge      Plan  Patient would benefit from: PT eval and skilled physical therapy  Planned modality interventions: TENS, thermotherapy: hydrocollator packs and cryotherapy  Planned therapy interventions: joint mobilization, manual therapy, massage, neuromuscular re-education, patient education, therapeutic exercise, home exercise program and ADL training  Treatment plan discussed with: patient        Subjective Evaluation    History of Present Illness  Mechanism of injury: :Pt reports diagnosis of fibromyalgia with ongoing global back and B shoulder pain over the past few years  Pt states pain is most felt in low back with numbness and tingling into BLE when low back pain intensifies  Pt reports numbness and tingling occurs in all extremities  Pt reports hx of sciatica and describes sx as throbbing, numbness, tingling, and burning starting in low back and radiating down into B feet  At present time pt reports numbness just below the knee  Pt reports pain ranges from 6-8 over the past few weeks  Pt reports trials of land-based and aquatic physical therapy sessions approximately 10 years ago  Per pt report, aquatic therapy seemed to work better and provided relief for a few hours after session, however, land-based therapy seemed to make pain worse  Pt states aggravating factors include: sweeping, cleaning, standing to wash dishes, stress  Pt reports intermittent difficulty with amb stairs and even surfaces  Pt reports relieving factors include: massage, ice, heating pad, medications (naproxyn and gabapentin), and bed rest for up to a few days  Pt goals to improve mobility and ability to perform household chores  Pt reports frequent follow-up with orthopedic and family doctor when needed in addition to every 3-5 months     7/19: Pt returns to therapy after 2-month hiatus, secondary to brother passing away and fibromyalgia  Reports no changes in symptoms since starting therapy  Reports she only ever has mild and temporary relief with therapy treatments or medications  States she experiences burning and throbbing throughout LEs, throughout back, which she reports is constant  Reports her pain becomes so intense some days that she cannot get out of bed  Denies change sin bowel or bladder function, however, reports weight gain due to craving, which she attributes to medications  Aside from constant symptoms, states standing or sitting long periods of time, sweeping, kneeling, or doing household chores aggravate symptoms     States she occasionally attempts stretches as prescribed in PT 2-3 months ago, however, denies relief  Patient attributes her symptoms to fibromylagia, for which she sees a rheumatologist  States she has follow up with her in September  Pain  Current pain ratin  At best pain ratin  At worst pain rating: 10  Location: B UT and low back into B LE  Quality: throbbing and burning (tingling and numbness)  Relieving factors: medications, heat and rest (gabapentin helps reduce pain, however, causes headaches and stomach aches)  Aggravating factors: standing (sweeping and completing household chores)  Progression: worsening      Diagnostic Tests    FCE comments: : Pt reports CT and X-ray scans completed approximately 2 years ago Treatments  Previous treatment: physical therapy and medication  Current treatment: medication  Patient Goals  Patient goals for therapy: decreased pain, increased motion and independence with ADLs/IADLs          Objective     Palpation   Left   Hypertonic in the lumbar paraspinals  Tenderness of the gluteus richard, piriformis and quadratus lumborum  Right   Hypertonic in the lumbar paraspinals  Tenderness of the gluteus richard, piriformis and quadratus lumborum       Neurological Testing     Sensation     Lumbar   Left   Diminished: light touch    Right   Intact: light touch    Reflexes   Left   Patellar (L4): brisk (3+)  Achilles (S1): normal (2+)    Right   Patellar (L4): brisk (3+)  Achilles (S1): normal (2+)    Additional Neurological Details  :  LOWER EXTREMITY DERMATOMES: Decreased left L3, L4, L5, S1, S2      Active Range of Motion     Lumbar   Flexion: 70 degrees  with pain  Extension: 15 degrees   Left lateral flexion: 20 degrees       Right lateral flexion: 25 degrees     Additional Active Range of Motion Details  LUMBAR AROM -  Flexion, extension, bilat lateral flexion measured with bubble inclinometer at L1; Rotation measured with goniometer and patient seated      Joint Play     Hypomobile: L1, L2, L3, L4 and L5 Mechanical Assessment    Cervical      Thoracic      Lumbar    Standing flexion: repeated movements   Pain location:no change  Pain intensity: better  Pain level: decreased  improvements into flexion ROM   Standing extension: repeated movements  Pain location: no change  Pain level: increased  Lying extension: repeated movements  Pain location: no change    Strength/Myotome Testing     Left Hip   Planes of Motion   Flexion: 4-  External rotation: 4  Internal rotation: 4-    Right Hip   Planes of Motion   Flexion: 4-  External rotation: 4  Internal rotation: 4-    Left Knee   Flexion: 4+  Extension: 4+    Right Knee   Flexion: 4+  Extension: 4    Left Ankle/Foot   Dorsiflexion: 5  Plantar flexion: 4+  Great toe extension: 5    Right Ankle/Foot   Dorsiflexion: 4+  Plantar flexion: 4  Great toe extension: 4+    Tests     Lumbar     Left   Negative passive SLR and slump test      Right   Positive slump test    Negative passive SLR       Left Pelvic Girdle/Sacrum   Positive: active SLR test      Right Pelvic Girdle/Sacrum   Negative: active SLR test      General Comments:      Lumbar Comments  4/26:   Pt report improvements in back pain with lumbar spine PA mobs          Precautions: depression, fibromyalgia, sciatica

## 2019-07-24 ENCOUNTER — SOCIAL WORK (OUTPATIENT)
Dept: BEHAVIORAL/MENTAL HEALTH CLINIC | Facility: CLINIC | Age: 54
End: 2019-07-24
Payer: COMMERCIAL

## 2019-07-24 ENCOUNTER — DOCUMENTATION (OUTPATIENT)
Dept: BEHAVIORAL/MENTAL HEALTH CLINIC | Facility: CLINIC | Age: 54
End: 2019-07-24

## 2019-07-24 DIAGNOSIS — F33.2 SEVERE RECURRENT MAJOR DEPRESSION WITHOUT PSYCHOTIC FEATURES (HCC): Primary | ICD-10-CM

## 2019-07-24 DIAGNOSIS — F41.0 PANIC DISORDER WITHOUT AGORAPHOBIA: ICD-10-CM

## 2019-07-24 DIAGNOSIS — F43.12 CHRONIC POSTTRAUMATIC STRESS DISORDER: ICD-10-CM

## 2019-07-24 DIAGNOSIS — F41.1 GENERALIZED ANXIETY DISORDER: ICD-10-CM

## 2019-07-24 PROCEDURE — 90834 PSYTX W PT 45 MINUTES: CPT | Performed by: SOCIAL WORKER

## 2019-07-24 NOTE — PSYCH
Psychotherapy Provided: Individual Psychotherapy 45 minutes "I have been in bed a lot and as a result had missed two physical therapy appointments  "When it hits, it hits and lasts for days  I do my wash when I can " It is my sciatic nerve " states the duration  of the sciatic pain varies from a day to many weeks; "It is hard to sleep when it is bothering me " describes her new living arrangement is "less stressful, more peaceful;" "I haven't been doing too much out of the ordinary  " states the anxiety she has experienced is "not as bad as before;" She attributes the decrease in the anxiety to a decrease in stress  She states is maintaining is distance with those individuals who are problematical with her  Discussed/reviewed boundaries and self care as well as assertiveness as importance components to her maintaining a quality of life; A: presents as [id number]-naaa about her life; She noted her involvement with her former  is "civil (adding that he spend time with her daughter's 9year old son)  She presents as doing what she can do for herself within her limitations  P:(G# 1)will continue to be mindful of self care, assertiveness and boundaries; Length of time in session: 45 minutes, follow up in 1 month    Goals addressed in session: Goal 1     Pain:      moderate to severe ("my lower back, my legs")    7    Current suicide risk : [address]: Diagnosis and Treatment Plan explained to Yelena Jacobson relates understanding diagnosis and is agreeable to Treatment Plan   Yes

## 2019-07-24 NOTE — BH TREATMENT PLAN
Adriano Carreon  1965       Date of Initial Treatment Plan: 10/12/16  Date of Current Treatment Plan: 07/24/19    Treatment Plan Number 9    Strengths/Personal Resources for Self Care: "hard working, organized, compulsive tendencies; "sort of a perfectionist;"     Diagnosis:   1  Severe recurrent major depression without psychotic features ([facility] [address] )     2  Panic disorder without agoraphobia     3  Chronic posttraumatic stress disorder     4  Generalized anxiety disorder         Area of Needs: anxiousness; panic attacks; Long Term Goal 1: AI want to continue to effectively manage the anxiety  Target Date: 11/24/19  Completion Date: n/a         Short Term Objectives for Goal 1: AI will continue to effectively manage boundaries with my family  B  I want to continue to have realistic expectations of myself regarding for what I am/am not responsible in my life ("having less stress")  C  I want to cotinue to be more mindful of the importance of taking care of myself  D  I will continue to be more mindful of using effective communication strategies  E   I will continue to effectively manage the panic attacks  Target Date: 11/24/19  Completion Date: n/a    Long Term Goal 2: I want to continue to grow in wisdom concerning making effective decisions  Target Date: 11/24/19  Completion Date: N/A    Short Term Objectives for Goal 2: AI will review my values  B  I will review my affirmations  C  I will review my strengths/assets/qualities  D  I will understand aarti gomez is being sure of one's self and knowing that, at times, in being unique one may, at times, feel lonely  Yessica Brooms the finalization of the divorce, I want to feel more consistently comfortable with myself         Target Date:  11/24/19  Completion Date: n/a           GOAL 1: Modality: Individual 1x per month   Completion Date n/a and The person(s) responsible for carrying out the plan is  Munira    GOAL 2: Modality: Individual 1x per month Completion Date n/a and The person(s) responsible for carrying out the plan is  Breana Soler3: Diagnosis and Treatment Plan explained to Virginia Rios relates understanding diagnosis and is agreeable to Treatment Plan  yes      Client Comments : Please share your thoughts, feelings, need and/or experiences regarding your treatment plan:

## 2019-07-24 NOTE — PROGRESS NOTES
Treatment Plan Tracking    # 1Treatment Plan not completed within required time limits due to: client di not schedule a counseling appt within the four month time frame required to update a txt plan;

## 2019-07-25 ENCOUNTER — TELEPHONE (OUTPATIENT)
Dept: FAMILY MEDICINE CLINIC | Facility: CLINIC | Age: 54
End: 2019-07-25

## 2019-07-25 NOTE — TELEPHONE ENCOUNTER
Please encourage her to download the bette to help her track what she is eating  She should try to limit to 1500 calories a day  Please encourage 30 minutes of exercise at least 3-5 times a week  Please offer her referral to weight management as discussed at her last visit  She can alternatively try the above directions and follow-up in 1 month to review her progress

## 2019-07-25 NOTE — TELEPHONE ENCOUNTER
Patient call today to let you know that she is getting a Xray and MRI for her back , ordered by Dr Lilian Kan, also she mention that she is trying to eat healthy and increase her exercises but nothing is helping, she would like to know if you can call her  I did ask her how many calories she is eating a day and she didn't know, she did not download the bette for calories count, but she mention that she is doing more exercises

## 2019-07-26 ENCOUNTER — APPOINTMENT (OUTPATIENT)
Dept: PHYSICAL THERAPY | Facility: CLINIC | Age: 54
End: 2019-07-26
Payer: COMMERCIAL

## 2019-07-26 NOTE — TELEPHONE ENCOUNTER
Spoke with pt this afternoon, she will download the bette, and change her diet  Pt scheduled and appointment to see you on 09/13/2019

## 2019-08-20 ENCOUNTER — TELEPHONE (OUTPATIENT)
Dept: BEHAVIORAL/MENTAL HEALTH CLINIC | Facility: CLINIC | Age: 54
End: 2019-08-20

## 2019-08-28 LAB — EXTERNAL HIV SCREEN: NORMAL
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
## 2019-09-06 ENCOUNTER — OFFICE VISIT (OUTPATIENT)
Dept: PSYCHIATRY | Facility: CLINIC | Age: 54
End: 2019-09-06
Payer: COMMERCIAL

## 2019-09-06 VITALS
HEIGHT: 61 IN | HEART RATE: 94 BPM | BODY MASS INDEX: 30.4 KG/M2 | DIASTOLIC BLOOD PRESSURE: 69 MMHG | SYSTOLIC BLOOD PRESSURE: 104 MMHG | WEIGHT: 161 LBS

## 2019-09-06 DIAGNOSIS — G25.9 EXTRAPYRAMIDAL REACTION: Chronic | ICD-10-CM

## 2019-09-06 DIAGNOSIS — F41.1 GENERALIZED ANXIETY DISORDER: Chronic | ICD-10-CM

## 2019-09-06 DIAGNOSIS — F41.0 PANIC DISORDER WITHOUT AGORAPHOBIA: Chronic | ICD-10-CM

## 2019-09-06 DIAGNOSIS — F43.12 POST-TRAUMATIC STRESS DISORDER, CHRONIC: Chronic | ICD-10-CM

## 2019-09-06 DIAGNOSIS — G47.09 OTHER INSOMNIA: Chronic | ICD-10-CM

## 2019-09-06 DIAGNOSIS — F33.2 MAJOR DEPRESSIVE DISORDER, RECURRENT, SEVERE WITHOUT PSYCHOTIC FEATURES (HCC): Primary | Chronic | ICD-10-CM

## 2019-09-06 DIAGNOSIS — R63.2 BINGE EATING: Chronic | ICD-10-CM

## 2019-09-06 PROCEDURE — 99213 OFFICE O/P EST LOW 20 MIN: CPT | Performed by: PSYCHIATRY & NEUROLOGY

## 2019-09-06 RX ORDER — BENZTROPINE MESYLATE 0.5 MG/1
0.5 TABLET ORAL 2 TIMES DAILY PRN
Qty: 60 TABLET | Refills: 3 | Status: SHIPPED | OUTPATIENT
Start: 2019-09-06 | End: 2019-11-01 | Stop reason: ALTCHOICE

## 2019-09-06 RX ORDER — TOPIRAMATE 25 MG/1
25 TABLET ORAL
Qty: 30 TABLET | Refills: 3 | Status: SHIPPED | OUTPATIENT
Start: 2019-09-06 | End: 2019-11-01 | Stop reason: SDUPTHER

## 2019-09-06 RX ORDER — TRAZODONE HYDROCHLORIDE 100 MG/1
100 TABLET ORAL
Qty: 30 TABLET | Refills: 3 | Status: SHIPPED | OUTPATIENT
Start: 2019-09-06 | End: 2019-11-01 | Stop reason: SDUPTHER

## 2019-09-06 RX ORDER — ARIPIPRAZOLE 10 MG/1
10 TABLET ORAL
Qty: 30 TABLET | Refills: 3 | Status: SHIPPED | OUTPATIENT
Start: 2019-09-06 | End: 2019-11-01 | Stop reason: SDUPTHER

## 2019-09-06 RX ORDER — DULOXETIN HYDROCHLORIDE 60 MG/1
120 CAPSULE, DELAYED RELEASE ORAL DAILY
Qty: 60 CAPSULE | Refills: 3 | Status: SHIPPED | OUTPATIENT
Start: 2019-09-06 | End: 2019-11-01 | Stop reason: SDUPTHER

## 2019-09-06 RX ORDER — NAPROXEN 500 MG/1
500 TABLET ORAL 2 TIMES DAILY
Refills: 3 | COMMUNITY
Start: 2019-08-16

## 2019-09-11 ENCOUNTER — SOCIAL WORK (OUTPATIENT)
Dept: BEHAVIORAL/MENTAL HEALTH CLINIC | Facility: CLINIC | Age: 54
End: 2019-09-11
Payer: COMMERCIAL

## 2019-09-11 DIAGNOSIS — F33.2 SEVERE RECURRENT MAJOR DEPRESSION WITHOUT PSYCHOTIC FEATURES (HCC): Primary | ICD-10-CM

## 2019-09-11 DIAGNOSIS — F41.1 GENERALIZED ANXIETY DISORDER: Chronic | ICD-10-CM

## 2019-09-11 PROCEDURE — 90834 PSYTX W PT 45 MINUTES: CPT | Performed by: SOCIAL WORKER

## 2019-09-11 NOTE — PSYCH
Psychotherapy Provided: Individual Psychotherapy 45 minutes PHQ 9 = 22; With her depression score of 22 today, she identifies such contributing factors as "the simple fact that I have to rely on people;"  reports an "8" level pain "for a couple of days;" reports had had an Xray (Scoliosis at end of my spine and some arthritis) and an MRI ("I have a herniated disc on the L side of my lower back ") and will be referred to a spinal specialist;  is unable to take steroids adding that "steroids bother me;" states surgery is option; She suspects with "years of lifting (as a caregiver) with childcare" may be a factor with the reported herniated disc  "I have been kind of quiet, kind of down, and I don't know why "  has been thinking about the Social Security Disability ("This is my third time (applying)  "); She reports a hearing is scheduled for November 12, 2019; "I just want to be on my own two feet again " When asked her expectation of her "being on my own two feet: "a roof over my head; providing on my own; Right now I have to settle for things  When I need something I want to be able to get it " "I have not been too talkative with anyone as much as I normally have (would be)  "   has been living in her current living arrangement since the end of winter, 2019;  "Since I have been (age) 17 I have been out on my own ("I have worked since I am (age) 14 ")  " "I miss my children  Today is my daughter's birthday [place], [place])  She turned (age) 28 " states her son had a birthday [place], [place]) on August 14, (age 35); "I am going to have to get rid of my car   Relying on people has been very hard "  Discussed/reviewed self care as integral to enhance her ability to cope with her reported situation: listen to soft music, watch a good movie, read, personal hygiene, contacts with friends; discussed her applying for [employer] transportation;: A: presents as and confirms she is "down" regarding her having to rely on others; "I have not actually spent quality time with (her immediate family-doing something with them together (other than their providing transportation for her as an example)  I feel put on the back burner (by her family) " She attributes her reported lack of being social for the past month due to reported transportation issues  She is willing to apply for [employer] transportation  P: (#1) will apply for [employer] transporation; will contact her friends to support her social life;     Length of time in session: 45 minutes, follow up in 1 month    Goals addressed in session: Goal 1     Pain:      moderate to severe    8    Current suicide risk : [address]: Diagnosis and Treatment Plan explained to Jacquie Otero relates understanding diagnosis and is agreeable to Treatment Plan   Yes

## 2019-09-26 ENCOUNTER — TELEPHONE (OUTPATIENT)
Dept: PSYCHIATRY | Facility: CLINIC | Age: 54
End: 2019-09-26

## 2019-09-26 NOTE — TELEPHONE ENCOUNTER
Patient's law office called for the rest of the patient's record  She received the records from Dr Erin Sweeney  I will put the request form in your mailbox      [facility]  Fax [phone]  Phone [phone]

## 2019-10-04 NOTE — TELEPHONE ENCOUNTER
[employer] called again about the records  Called to let them know that the request for Dr Rumalda Goldmann records was processed on 09/09/19  They have received those  Patient's law firm is requesting the status of her records from [facility]

## 2019-10-22 ENCOUNTER — TELEPHONE (OUTPATIENT)
Dept: PSYCHIATRY | Facility: CLINIC | Age: 54
End: 2019-10-22

## 2019-10-22 NOTE — TELEPHONE ENCOUNTER
Law office is calling asking if we received questionnaires for this patient for both therapy and psychiatry

## 2019-10-23 NOTE — TELEPHONE ENCOUNTER
Nia Holder said she received it which is separate from the [address] completed but it is also for his disability  I can have them send another one since it doesn't look like we have it in the chart

## 2019-10-24 ENCOUNTER — DOCUMENTATION (OUTPATIENT)
Dept: BEHAVIORAL/MENTAL HEALTH CLINIC | Facility: CLINIC | Age: 54
End: 2019-10-24

## 2019-10-24 NOTE — PROGRESS NOTES
10/24/19 @ 5:20 PM called Munira to confirm her Tuesday, October 29, 2019 4 PM ind psych appt with the goal to complete a SSD form during this session to then submit to her Marylou Velazco

## 2019-10-24 NOTE — TELEPHONE ENCOUNTER
Kang Valenzuela from [facility] office called asking for complete questionaires from Dr Devendra Grimaldo and Rober Clemons  She is asking about the progress on these  [phone]

## 2019-10-29 ENCOUNTER — SOCIAL WORK (OUTPATIENT)
Dept: BEHAVIORAL/MENTAL HEALTH CLINIC | Facility: CLINIC | Age: 54
End: 2019-10-29
Payer: COMMERCIAL

## 2019-10-29 DIAGNOSIS — F33.2 SEVERE RECURRENT MAJOR DEPRESSION WITHOUT PSYCHOTIC FEATURES (HCC): ICD-10-CM

## 2019-10-29 DIAGNOSIS — F41.0 PANIC DISORDER WITHOUT AGORAPHOBIA: ICD-10-CM

## 2019-10-29 DIAGNOSIS — F41.1 GENERALIZED ANXIETY DISORDER: Primary | ICD-10-CM

## 2019-10-29 DIAGNOSIS — F43.12 CHRONIC POSTTRAUMATIC STRESS DISORDER: ICD-10-CM

## 2019-10-29 PROCEDURE — 90834 PSYTX W PT 45 MINUTES: CPT | Performed by: SOCIAL WORKER

## 2019-10-29 NOTE — TELEPHONE ENCOUNTER
Patient changed appointment to this Friday 11/1/19 because the law firm is insisting on having the questionnaire done before the hearing on the 12th

## 2019-10-29 NOTE — PSYCH
Psychotherapy Provided: Individual Psychotherapy 45 minutes PHq 9 = 17; SUKUMAR 7 = 15; She noted has adjusted to her new living arrangements with a friend  The majority of the session was spent completing an extensive form (Mental Impairment Questionnaire (RFC and Listings) for her Anuradha Duenas, as part of the [address] (SSD) Application process with a reported hearing date of November 12, 2019  She noted this is her third attempt to apply for SSD  Some of the more prominent symptoms, functioning factors included: memory issues, irritability, easily fatigued, frequent distractibility, preoccupation with orderliness and perfection, restlessness and difficulty relaxing; discussed/reviewed her repeated concern for her to be able to function to, at the least, not have to rely on others to meet her needs; A: She is frustrated with her depression, fibromyalgia and chronic pain as it has impeded her from her being able to be fully independent  P:(G#1 ) will continue to advocate for herself (such as durrng this session in her providing important information as part of the SSD application process; Length of time in session: 45 minutes, follow up in 1month    Goals addressed in session: Goal 1     Pain:      moderate to severe    7    Current suicide risk : [address]: Diagnosis and Treatment Plan explained to Sly Estrada relates understanding diagnosis and is agreeable to Treatment Plan   Yes

## 2019-10-29 NOTE — TELEPHONE ENCOUNTER
Patient is being seen today by [facility]  Also, the law firm sent over a shorter version of the questionnaire for you requesting that it is completed by Dr Erin Sweeney  The [employer] has been told they will only receive the medical records from you, but I wanted to make you aware of the reason for the questionnaire in your mailbox

## 2019-10-29 NOTE — TELEPHONE ENCOUNTER
Munira needs to be seen for the appointment in order to for the form to be filled out  She has an appointment pending on 11/14/19  She can call for cancellations if she wanted to come sooner  Form will be filled out after she is assessed at the time of the appointment  Fee will be $25, she will need to sign a release form if not already done

## 2019-11-01 ENCOUNTER — OFFICE VISIT (OUTPATIENT)
Dept: PSYCHIATRY | Facility: CLINIC | Age: 54
End: 2019-11-01
Payer: COMMERCIAL

## 2019-11-01 VITALS
DIASTOLIC BLOOD PRESSURE: 76 MMHG | WEIGHT: 158 LBS | SYSTOLIC BLOOD PRESSURE: 115 MMHG | HEART RATE: 111 BPM | BODY MASS INDEX: 29.83 KG/M2 | HEIGHT: 61 IN

## 2019-11-01 DIAGNOSIS — G47.09 OTHER INSOMNIA: Chronic | ICD-10-CM

## 2019-11-01 DIAGNOSIS — F41.0 PANIC DISORDER WITHOUT AGORAPHOBIA: Chronic | ICD-10-CM

## 2019-11-01 DIAGNOSIS — F43.12 POST-TRAUMATIC STRESS DISORDER, CHRONIC: Chronic | ICD-10-CM

## 2019-11-01 DIAGNOSIS — R63.2 BINGE EATING: Chronic | ICD-10-CM

## 2019-11-01 DIAGNOSIS — F33.2 MAJOR DEPRESSIVE DISORDER, RECURRENT, SEVERE WITHOUT PSYCHOTIC FEATURES (HCC): Primary | Chronic | ICD-10-CM

## 2019-11-01 DIAGNOSIS — F41.1 GENERALIZED ANXIETY DISORDER: Chronic | ICD-10-CM

## 2019-11-01 PROBLEM — S13.4XXA WHIPLASH INJURY TO NECK: Status: ACTIVE | Noted: 2019-11-01
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
## 2019-11-01 PROBLEM — G25.9 EXTRAPYRAMIDAL REACTION: Chronic | Status: RESOLVED | Noted: 2018-08-16 | Resolved: 2019-11-01

## 2019-11-01 PROCEDURE — 99213 OFFICE O/P EST LOW 20 MIN: CPT | Performed by: PSYCHIATRY & NEUROLOGY

## 2019-11-01 PROCEDURE — 99910: CPT | Performed by: PSYCHIATRY & NEUROLOGY

## 2019-11-01 RX ORDER — FLUCONAZOLE 150 MG/1
TABLET ORAL
Refills: 4 | COMMUNITY
Start: 2019-09-11

## 2019-11-01 RX ORDER — ARIPIPRAZOLE 15 MG/1
15 TABLET ORAL
Qty: 30 TABLET | Refills: 3 | Status: SHIPPED | OUTPATIENT
Start: 2019-11-01 | End: 2020-02-19 | Stop reason: SDUPTHER

## 2019-11-01 RX ORDER — LIDOCAINE 50 MG/G
PATCH TOPICAL
Refills: 4 | COMMUNITY
Start: 2019-09-19

## 2019-11-01 RX ORDER — TOPIRAMATE 50 MG/1
50 TABLET, FILM COATED ORAL
Qty: 30 TABLET | Refills: 3 | Status: SHIPPED | OUTPATIENT
Start: 2019-11-01 | End: 2020-02-19 | Stop reason: SDUPTHER

## 2019-11-01 RX ORDER — ERGOCALCIFEROL 1.25 MG/1
50000 CAPSULE ORAL WEEKLY
Refills: 11 | COMMUNITY
Start: 2019-09-06

## 2019-11-01 RX ORDER — CLONAZEPAM 0.5 MG/1
0.5 TABLET ORAL 3 TIMES DAILY
Qty: 90 TABLET | Refills: 3 | Status: SHIPPED | OUTPATIENT
Start: 2019-11-25 | End: 2020-02-19 | Stop reason: SDUPTHER

## 2019-11-01 RX ORDER — DULOXETIN HYDROCHLORIDE 60 MG/1
120 CAPSULE, DELAYED RELEASE ORAL DAILY
Qty: 60 CAPSULE | Refills: 3 | Status: SHIPPED | OUTPATIENT
Start: 2019-11-01 | End: 2020-02-19 | Stop reason: SDUPTHER

## 2019-11-01 RX ORDER — TRAZODONE HYDROCHLORIDE 100 MG/1
100 TABLET ORAL
Qty: 30 TABLET | Refills: 3 | Status: SHIPPED | OUTPATIENT
Start: 2019-11-01 | End: 2020-02-19 | Stop reason: SDUPTHER

## 2019-11-01 RX ORDER — FLUTICASONE PROPIONATE 50 MCG
SPRAY, SUSPENSION (ML) NASAL
COMMUNITY
Start: 2014-08-11
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
## 2019-11-18 ENCOUNTER — OFFICE VISIT (OUTPATIENT)
Dept: FAMILY MEDICINE CLINIC | Facility: CLINIC | Age: 54
End: 2019-11-18
Payer: COMMERCIAL

## 2019-11-18 VITALS
WEIGHT: 158.38 LBS | TEMPERATURE: 97.9 F | HEART RATE: 100 BPM | SYSTOLIC BLOOD PRESSURE: 90 MMHG | DIASTOLIC BLOOD PRESSURE: 60 MMHG | HEIGHT: 62 IN | BODY MASS INDEX: 29.15 KG/M2

## 2019-11-18 DIAGNOSIS — M79.7 FIBROMYALGIA: ICD-10-CM

## 2019-11-18 DIAGNOSIS — Z23 FLU VACCINE NEED: ICD-10-CM

## 2019-11-18 DIAGNOSIS — Z12.31 ENCOUNTER FOR SCREENING MAMMOGRAM FOR BREAST CANCER: ICD-10-CM

## 2019-11-18 DIAGNOSIS — E66.3 OVERWEIGHT (BMI 25.0-29.9): Primary | ICD-10-CM

## 2019-11-18 DIAGNOSIS — Z11.4 SCREENING FOR HIV (HUMAN IMMUNODEFICIENCY VIRUS): ICD-10-CM

## 2019-11-18 DIAGNOSIS — F33.2 MAJOR DEPRESSIVE DISORDER, RECURRENT, SEVERE WITHOUT PSYCHOTIC FEATURES (HCC): Chronic | ICD-10-CM

## 2019-11-18 DIAGNOSIS — Z13.220 LIPID SCREENING: ICD-10-CM

## 2019-11-18 DIAGNOSIS — E55.9 VITAMIN D DEFICIENCY: ICD-10-CM

## 2019-11-18 DIAGNOSIS — Z13.1 DIABETES MELLITUS SCREENING: ICD-10-CM

## 2019-11-18 PROCEDURE — 90682 RIV4 VACC RECOMBINANT DNA IM: CPT | Performed by: PHYSICIAN ASSISTANT

## 2019-11-18 PROCEDURE — 1036F TOBACCO NON-USER: CPT | Performed by: PHYSICIAN ASSISTANT

## 2019-11-18 PROCEDURE — 90471 IMMUNIZATION ADMIN: CPT | Performed by: PHYSICIAN ASSISTANT

## 2019-11-18 PROCEDURE — 99214 OFFICE O/P EST MOD 30 MIN: CPT | Performed by: PHYSICIAN ASSISTANT

## 2019-11-18 NOTE — ASSESSMENT & PLAN NOTE
Stable with current regimen  Will continue with psychiatry as directed  Depression Screening Follow-up Plan:  Continue regular follow-up with their psychologist/therapist/psychiatrist who is managing their mental health condition(s)

## 2019-11-18 NOTE — ASSESSMENT & PLAN NOTE
Improved  Lost about 2 5 pounds  Encouraged to continue working on weight loss with less carbs, more veggies and lean meats, and drinking mostly water  She was encouraged to try exercising more often  She can try going for walks at the mall now that the weather is colder  Will return in 3 months for another follow-up  BMI Counseling: Body mass index is 29 44 kg/m²  The BMI is above normal  Nutrition recommendations include reducing portion sizes, 3-5 servings of fruits/vegetables daily, moderation in carbohydrate intake and increasing intake of lean protein  Exercise recommendations include exercising 3-5 times per week

## 2019-11-18 NOTE — ASSESSMENT & PLAN NOTE
Stable  Continue with Cymbalta and gabapentin as directed by Rheumatology  Encouraged to remain active as tolerated
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
## 2019-11-26 ENCOUNTER — TELEPHONE (OUTPATIENT)
Dept: BEHAVIORAL/MENTAL HEALTH CLINIC | Facility: CLINIC | Age: 54
End: 2019-11-26

## 2019-11-26 NOTE — TELEPHONE ENCOUNTER
11/26/19 @ 1: 45 PM lm at cell # provided as she had not yet arrived for her 1 PM counseling appointment scheduled for today  Correspondence will be sent for this "no show" counseling appointment

## 2019-12-10 ENCOUNTER — DOCUMENTATION (OUTPATIENT)
Dept: BEHAVIORAL/MENTAL HEALTH CLINIC | Facility: CLINIC | Age: 54
End: 2019-12-10

## 2019-12-10 NOTE — PROGRESS NOTES
Treatment Plan Tracking    # 1Treatment Plan not completed within required time limits due to: Client cancelled her appt for today as no roundtrip transportation reported  Yuri Lira

## 2019-12-10 NOTE — PROGRESS NOTES
12/10/19 @ 8: 35 AM lm to cancel her ind psych appt for todaynoting she does not have round trip transportation;

## 2019-12-30 ENCOUNTER — TELEPHONE (OUTPATIENT)
Dept: FAMILY MEDICINE CLINIC | Facility: CLINIC | Age: 54
End: 2019-12-30

## 2019-12-30 NOTE — TELEPHONE ENCOUNTER
Started about a week ago w itchy rash / hives on neck area , arms and legs  Has been taking benadryl at night only as it makes her sleepy  Also using cortisone cream   Rash waxes and wanes  Not sure what else to do  She did not want to sched an appt at this time     I suggested trying otc zyrtec/allegra in am and benadryl in pm

## 2020-01-23 ENCOUNTER — DOCUMENTATION (OUTPATIENT)
Dept: BEHAVIORAL/MENTAL HEALTH CLINIC | Facility: CLINIC | Age: 55
End: 2020-01-23

## 2020-01-23 ENCOUNTER — SOCIAL WORK (OUTPATIENT)
Dept: BEHAVIORAL/MENTAL HEALTH CLINIC | Facility: CLINIC | Age: 55
End: 2020-01-23
Payer: COMMERCIAL

## 2020-01-23 DIAGNOSIS — F33.2 SEVERE RECURRENT MAJOR DEPRESSION WITHOUT PSYCHOTIC FEATURES (HCC): ICD-10-CM

## 2020-01-23 DIAGNOSIS — F43.12 CHRONIC POSTTRAUMATIC STRESS DISORDER: ICD-10-CM

## 2020-01-23 DIAGNOSIS — F41.1 GENERALIZED ANXIETY DISORDER: Primary | ICD-10-CM

## 2020-01-23 DIAGNOSIS — F41.0 PANIC DISORDER WITHOUT AGORAPHOBIA: ICD-10-CM

## 2020-01-23 PROCEDURE — 90834 PSYTX W PT 45 MINUTES: CPT | Performed by: SOCIAL WORKER

## 2020-01-23 NOTE — PROGRESS NOTES
Treatment Plan Tracking    # 1Treatment Plan not completed within required time limits due to: Client did not schedule a counseling appointment within the four month time frame required to update a txt plan  Sina Adame

## 2020-01-23 NOTE — BH TREATMENT PLAN
Jodi Barnes  1965       Date of Initial Treatment Plan:  10/12/16    Date of Current Treatment Plan: 01/23/20    Treatment Plan Number  10    Strengths/Personal Resources for Self Care:  "hard working, organized, compulsive tendencies;  "sort of a perfectionist;"   Diagnosis:   1  Generalized anxiety disorder     2  Severe recurrent major depression without psychotic features ([address] )     3  Panic disorder without agoraphobia     4  Chronic posttraumatic stress disorder         Area of Needs:  Anxiousness; panic attacks; Long Term Goal 1: AI want to continue to effectively manage the anxiety  Target Date:  5/23/2020  Completion Date: n/a         Short Term Objectives for Goal 1: AI will continue to effecitvely manage boundaries with my family  B  I want to continue to have realistic expectations of myself regardingfor what I am/am not responsible in my life ("having less stress")  B  I want to continue to be more mindful of the importance of taking care of myself  D  I will continue to be more mindful of using effective communicaiotn strategies  E  I will continue to effectively manage the panic attacks  Target Date: 5/23/2020     Complete Date: n/a    Long Term Goal 2: I want to continue to grow in wisdowm concenring making effecitve decisions  Target Date: 5/23/2020  Completion Date: N/A    Short Term Objectives for Goal 2: AA  I will review my values  B  I will review my affirmations  C  I will review my strengths/assets/qualities  D  I will understand taht happiness is being sure of one's self and knowing that, at times,, in being unique one may, at times, feel lonely  E  With the finalization of the divorce, I want to feel more consistently comfortable with myself      GOAL 1: Modality: Individual 1x per month   Completion Date n/a and The person(s) responsible for carrying out the plan is  [address]: Diagnosis and Treatment Plan explained to Crissy Turcios relates understanding diagnosis and is agreeable to Treatment Plan  yes      Client Comments : Please share your thoughts, feelings, need and/or experiences regarding your treatment plan:

## 2020-01-23 NOTE — PSYCH
Psychotherapy Provided: Individual Psychotherapy 45 minutes PHQ 9 =16; SUKUMAR 7 = 12; states with her winning her Soc Sec  Dis  Case (effective 12/18/19), she states would like to have her own apartment in [place], [place] where her two sisters reside; "We are very close " She noted her winning the case has helped lower some of her PHQ 9 and SUKUMAR 7 scores  "These are the siblings on my father's side of the family  She notes she has informed her twin sisters, mother, her daughter, her son and her sister who resides in [facility], and her former  of her plans  She noted, when asked, that her daughter "seems to be doing much better " She attributed some of the improvement to her one sister ([facility]) being available for support to her daughter  She noted her current living arrangements have been less stressful than when she had resided with her daughter and wither her mother  She noted feels somwhat anxious about "starting over; fresh start "  Discussed/reviewed Stages of Changes (realistic expectations), self care and effective communication; A: presents as very positive in anticipation of her having her own income (She stated the alimony from her former  will stop with the activation of her Soc Sec Dis benefits ); She expressed concern about her eventually having her own transportation  P:(G#1) will explore/review stages of change and acknowledge the importance of her having realistic expectations with her processing her changes; Length of time in session: 45 minutes, follow up in 1 month    Goals addressed in session: Goal 1     Pain:      moderate to severe    6 ("It is overall ")    Current suicide risk : Low         Behavioral Health Treatment Plan  Luke: Diagnosis and Treatment Plan explained to Rica Vazquez relates understanding diagnosis and is agreeable to Treatment Plan   Yes
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
## 2020-02-19 ENCOUNTER — OFFICE VISIT (OUTPATIENT)
Dept: PSYCHIATRY | Facility: CLINIC | Age: 55
End: 2020-02-19
Payer: COMMERCIAL

## 2020-02-19 ENCOUNTER — TELEPHONE (OUTPATIENT)
Dept: ADMINISTRATIVE | Facility: OTHER | Age: 55
End: 2020-02-19

## 2020-02-19 VITALS
BODY MASS INDEX: 30.21 KG/M2 | HEART RATE: 108 BPM | SYSTOLIC BLOOD PRESSURE: 127 MMHG | WEIGHT: 160 LBS | DIASTOLIC BLOOD PRESSURE: 80 MMHG | HEIGHT: 61 IN

## 2020-02-19 DIAGNOSIS — F41.0 PANIC DISORDER WITHOUT AGORAPHOBIA: Chronic | ICD-10-CM

## 2020-02-19 DIAGNOSIS — R63.2 BINGE EATING: Chronic | ICD-10-CM

## 2020-02-19 DIAGNOSIS — F41.1 GENERALIZED ANXIETY DISORDER: Chronic | ICD-10-CM

## 2020-02-19 DIAGNOSIS — G47.09 OTHER INSOMNIA: Chronic | ICD-10-CM

## 2020-02-19 DIAGNOSIS — F43.12 POST-TRAUMATIC STRESS DISORDER, CHRONIC: Chronic | ICD-10-CM

## 2020-02-19 DIAGNOSIS — F33.2 MAJOR DEPRESSIVE DISORDER, RECURRENT, SEVERE WITHOUT PSYCHOTIC FEATURES (HCC): Primary | Chronic | ICD-10-CM

## 2020-02-19 PROCEDURE — 99213 OFFICE O/P EST LOW 20 MIN: CPT | Performed by: PSYCHIATRY & NEUROLOGY

## 2020-02-19 RX ORDER — TOPIRAMATE 50 MG/1
50 TABLET, FILM COATED ORAL 2 TIMES DAILY
Qty: 60 TABLET | Refills: 4 | Status: SHIPPED | OUTPATIENT
Start: 2020-02-19 | End: 2020-07-18

## 2020-02-19 RX ORDER — TRAZODONE HYDROCHLORIDE 100 MG/1
100 TABLET ORAL
Qty: 30 TABLET | Refills: 4 | Status: SHIPPED | OUTPATIENT
Start: 2020-02-19 | End: 2020-07-18

## 2020-02-19 RX ORDER — DULOXETIN HYDROCHLORIDE 60 MG/1
120 CAPSULE, DELAYED RELEASE ORAL DAILY
Qty: 60 CAPSULE | Refills: 4 | Status: SHIPPED | OUTPATIENT
Start: 2020-02-19 | End: 2020-07-18

## 2020-02-19 RX ORDER — CLONAZEPAM 0.5 MG/1
0.5 TABLET ORAL 3 TIMES DAILY
Qty: 90 TABLET | Refills: 3 | Status: SHIPPED | OUTPATIENT
Start: 2020-03-24 | End: 2020-06-05 | Stop reason: SDUPTHER

## 2020-02-19 RX ORDER — ARIPIPRAZOLE 15 MG/1
15 TABLET ORAL
Qty: 30 TABLET | Refills: 4 | Status: SHIPPED | OUTPATIENT
Start: 2020-02-19 | End: 2020-07-18

## 2020-02-19 NOTE — TELEPHONE ENCOUNTER
Upon review of the In Basket request we were able to locate, review, and update the patient chart as requested for HIV  Any additional questions or concerns should be emailed to the Practice Liaisons via [email or web address] email, please do not reply via In Basket      Thank you  Maruyri Vaughn

## 2020-02-19 NOTE — TELEPHONE ENCOUNTER
----- Message from Channing Matute sent at 2/19/2020  9:57 AM EST -----  Regarding: HIV SCREENING/ [facility]  Contact: [phone]  02/19/20 9:57 AM    Hello, our patient Jodi Barnes has had HIV completed/performed  Please assist in updating the patient chart by Care Everywhere      Thank you,  [facility]line [facility] [address]

## 2020-02-24 ENCOUNTER — DOCUMENTATION (OUTPATIENT)
Dept: BEHAVIORAL/MENTAL HEALTH CLINIC | Facility: CLINIC | Age: 55
End: 2020-02-24

## 2020-02-29 ENCOUNTER — TELEPHONE (OUTPATIENT)
Dept: OTHER | Facility: OTHER | Age: 55
End: 2020-02-29

## 2020-02-29 NOTE — TELEPHONE ENCOUNTER
MSG: PT RAN OUT OF MEDICATION CLONAZEPAM 0 5 MILLIGRAMS AND STATED IT WAS AN EMERGENCY TO HAVE- PT WOULD LIKE SCRIPT SENT TO [facility] ON [address] IN [place]   TIGER TXT PT INFO TO  @ 1043

## 2020-02-29 NOTE — TELEPHONE ENCOUNTER
Called pharmacy  She has a refill, does not need a new script  He tried to call her but there was no answer  Its ready for her

## 2020-04-06 ENCOUNTER — DOCUMENTATION (OUTPATIENT)
Dept: BEHAVIORAL/MENTAL HEALTH CLINIC | Facility: CLINIC | Age: 55
End: 2020-04-06

## 2020-04-09 ENCOUNTER — TELEPHONE (OUTPATIENT)
Dept: OTHER | Facility: OTHER | Age: 55
End: 2020-04-09

## 2020-04-09 ENCOUNTER — TELEMEDICINE (OUTPATIENT)
Dept: PSYCHIATRY | Facility: CLINIC | Age: 55
End: 2020-04-09
Payer: COMMERCIAL

## 2020-04-09 ENCOUNTER — TELEPHONE (OUTPATIENT)
Dept: PSYCHIATRY | Facility: CLINIC | Age: 55
End: 2020-04-09

## 2020-04-09 VITALS — BODY MASS INDEX: 30.21 KG/M2 | HEIGHT: 61 IN | WEIGHT: 160 LBS

## 2020-04-09 DIAGNOSIS — F43.12 POST-TRAUMATIC STRESS DISORDER, CHRONIC: Chronic | ICD-10-CM

## 2020-04-09 DIAGNOSIS — F41.0 PANIC DISORDER WITHOUT AGORAPHOBIA: Chronic | ICD-10-CM

## 2020-04-09 DIAGNOSIS — R63.2 BINGE EATING: Chronic | ICD-10-CM

## 2020-04-09 DIAGNOSIS — G47.09 OTHER INSOMNIA: Chronic | ICD-10-CM

## 2020-04-09 DIAGNOSIS — F41.1 GENERALIZED ANXIETY DISORDER: Chronic | ICD-10-CM

## 2020-04-09 DIAGNOSIS — F33.2 MAJOR DEPRESSIVE DISORDER, RECURRENT, SEVERE WITHOUT PSYCHOTIC FEATURES (HCC): Primary | Chronic | ICD-10-CM

## 2020-04-09 PROCEDURE — 99213 OFFICE O/P EST LOW 20 MIN: CPT | Performed by: PSYCHIATRY & NEUROLOGY

## 2020-04-09 PROCEDURE — 90833 PSYTX W PT W E/M 30 MIN: CPT | Performed by: PSYCHIATRY & NEUROLOGY

## 2020-04-15 ENCOUNTER — TELEPHONE (OUTPATIENT)
Dept: OTHER | Facility: OTHER | Age: 55
End: 2020-04-15

## 2020-04-15 ENCOUNTER — DOCUMENTATION (OUTPATIENT)
Dept: PSYCHIATRY | Facility: CLINIC | Age: 55
End: 2020-04-15

## 2020-04-23 ENCOUNTER — TELEMEDICINE (OUTPATIENT)
Dept: BEHAVIORAL/MENTAL HEALTH CLINIC | Facility: CLINIC | Age: 55
End: 2020-04-23
Payer: COMMERCIAL

## 2020-04-23 DIAGNOSIS — F41.0 PANIC DISORDER WITHOUT AGORAPHOBIA: ICD-10-CM

## 2020-04-23 DIAGNOSIS — F41.1 GENERALIZED ANXIETY DISORDER: ICD-10-CM

## 2020-04-23 DIAGNOSIS — F43.12 CHRONIC POSTTRAUMATIC STRESS DISORDER: Primary | ICD-10-CM

## 2020-04-23 PROCEDURE — 90832 PSYTX W PT 30 MINUTES: CPT | Performed by: SOCIAL WORKER

## 2020-06-05 ENCOUNTER — TELEPHONE (OUTPATIENT)
Dept: PSYCHIATRY | Facility: CLINIC | Age: 55
End: 2020-06-05

## 2020-06-05 DIAGNOSIS — F41.1 GENERALIZED ANXIETY DISORDER: Chronic | ICD-10-CM

## 2020-06-05 DIAGNOSIS — F41.0 PANIC DISORDER WITHOUT AGORAPHOBIA: Primary | Chronic | ICD-10-CM

## 2020-06-05 RX ORDER — CLONAZEPAM 0.5 MG/1
0.5 TABLET ORAL 3 TIMES DAILY
Qty: 90 TABLET | Refills: 3 | Status: CANCELLED | OUTPATIENT
Start: 2020-06-05 | End: 2020-10-03

## 2020-06-05 RX ORDER — CLONAZEPAM 0.5 MG/1
0.5 TABLET ORAL 3 TIMES DAILY
Qty: 90 TABLET | Refills: 0 | Status: SHIPPED | OUTPATIENT
Start: 2020-06-05 | End: 2020-08-10

## 2020-06-25 ENCOUNTER — TELEPHONE (OUTPATIENT)
Dept: BEHAVIORAL/MENTAL HEALTH CLINIC | Facility: CLINIC | Age: 55
End: 2020-06-25

## 2020-06-30 ENCOUNTER — DOCUMENTATION (OUTPATIENT)
Dept: BEHAVIORAL/MENTAL HEALTH CLINIC | Facility: CLINIC | Age: 55
End: 2020-06-30

## 2020-06-30 ENCOUNTER — DOCUMENTATION (OUTPATIENT)
Dept: PSYCHIATRY | Facility: CLINIC | Age: 55
End: 2020-06-30

## 2020-06-30 DIAGNOSIS — G47.09 OTHER INSOMNIA: Chronic | ICD-10-CM

## 2020-06-30 DIAGNOSIS — F41.0 PANIC DISORDER WITHOUT AGORAPHOBIA: Chronic | ICD-10-CM

## 2020-06-30 DIAGNOSIS — R63.2 BINGE EATING: Chronic | ICD-10-CM

## 2020-06-30 DIAGNOSIS — F41.1 GENERALIZED ANXIETY DISORDER: Chronic | ICD-10-CM

## 2020-06-30 DIAGNOSIS — F43.12 POST-TRAUMATIC STRESS DISORDER, CHRONIC: Chronic | ICD-10-CM

## 2020-06-30 DIAGNOSIS — F33.2 MAJOR DEPRESSIVE DISORDER, RECURRENT, SEVERE WITHOUT PSYCHOTIC FEATURES (HCC): Primary | Chronic | ICD-10-CM

## 2020-08-10 ENCOUNTER — TELEPHONE (OUTPATIENT)
Dept: PSYCHIATRY | Facility: CLINIC | Age: 55
End: 2020-08-10

## 2020-08-10 DIAGNOSIS — F41.0 PANIC DISORDER WITHOUT AGORAPHOBIA: Chronic | ICD-10-CM

## 2020-08-10 DIAGNOSIS — F41.1 GENERALIZED ANXIETY DISORDER: Chronic | ICD-10-CM

## 2020-08-10 RX ORDER — CLONAZEPAM 0.5 MG/1
TABLET ORAL
Qty: 90 TABLET | Refills: 0 | Status: SHIPPED | OUTPATIENT
Start: 2020-08-10 | End: 2020-08-11 | Stop reason: SDUPTHER

## 2020-08-10 NOTE — TELEPHONE ENCOUNTER
Patient has an appt with a Psych Physician on 8/24-  Would like a partial refill of her clonazepam until that appt

## 2020-08-10 NOTE — TELEPHONE ENCOUNTER
Will provide 30-day refill of Klonopin covering for Dr Usman SIDDIQUI PDMP system checked- filling scripts appropriately

## 2020-08-11 ENCOUNTER — TELEPHONE (OUTPATIENT)
Dept: PSYCHIATRY | Facility: CLINIC | Age: 55
End: 2020-08-11

## 2020-08-11 DIAGNOSIS — F41.0 PANIC DISORDER WITHOUT AGORAPHOBIA: Chronic | ICD-10-CM

## 2020-08-11 DIAGNOSIS — F41.1 GENERALIZED ANXIETY DISORDER: Chronic | ICD-10-CM

## 2020-08-11 RX ORDER — CLONAZEPAM 0.5 MG/1
0.5 TABLET ORAL 3 TIMES DAILY
Qty: 60 TABLET | Refills: 0 | Status: SHIPPED | OUTPATIENT
Start: 2020-08-11

## 2020-08-11 NOTE — TELEPHONE ENCOUNTER
Received a fax through [employer] dated 8/10/20 from [facility] pharmacy in [place]  Pharmacy comments state Patient requests prescription for Clonazepam 0 5 mg tablets be sent to this pharmacy  Woodward Dancer has relocated to this area and has an appointment with new psychiatrist on 8/24  She needs prescription to hold her over until new appointment  Please review in Dr Nir Herring Absence

## 2020-08-11 NOTE — PROGRESS NOTES
Will re-send script for Klonopin 0 5 mg tid to pharmacy in [place]- sent 60 tabs for 20-day supply to hold over until visit with new psychiatrist   Meenu Goel for Dr Concetta Arnold

## 2020-08-17 RX ORDER — CLONAZEPAM 0.5 MG/1
0.5 TABLET ORAL 3 TIMES DAILY
Qty: 90 TABLET | Refills: 0 | OUTPATIENT
Start: 2020-08-17 | End: 2020-09-16

## 2020-08-21 ENCOUNTER — TELEPHONE (OUTPATIENT)
Dept: PSYCHIATRY | Facility: CLINIC | Age: 55
End: 2020-08-21

## 2020-08-21 NOTE — TELEPHONE ENCOUNTER
Patient has called requesting medical records be sent to : She is currently in [place] (her [place])    I emailed her a KYLE to be sent back to me completed      [facility] [place], [address]    Phone [phone]    Fax   [phone]    Attn; Danae Causey

## 2021-12-22 NOTE — PROGRESS NOTES
[place] [facility]  FAMILY PRACTICE OFFICE VISIT       NAME: Mauricio Cintron  AGE: 47 y o  SEX: female       : 1965        MRN: [id number]    DATE: 10/22/2018  TIME: 8:17 PM    Assessment and Plan     Problem List Items Addressed This Visit     Allergic rhinitis      We reviewed that her ongoing symptoms over last 2 months appear to be related to allergies  She was encouraged to try switching from Zyrtec to Guerraview and was also directed to start using her Nasalide nasal spray on a consistent daily basis  She should call if her symptoms are worsening or failing to improve  Relevant Medications    fexofenadine (ALLEGRA) 180 MG tablet    Fibromyalgia       Patient notes slight improvement with increasing gabapentin dose  She will continue to follow with Rheumatology as directed  Severe recurrent major depression without psychotic features (HCC) (Chronic)       Stable  She will continue to follow with Psychiatry as well as her therapist            Other Visit Diagnoses     Well adult exam    -  Primary    Encounter for screening colonoscopy        Relevant Orders    Ambulatory referral to Gastroenterology    Encounter for screening mammogram for breast cancer        Relevant Orders    Mammo screening bilateral w cad    Sciatica of left side          Given refill on the be nabumetone to try which was helpful in the past     Relevant Medications    nabumetone (RELAFEN) 750 mg tablet    Need for Tdap vaccination        Relevant Orders    TDAP VACCINE GREATER THAN OR EQUAL TO 8YO IM (Completed)    Flu vaccine need        Relevant Orders    influenza vaccine, [id number], quadrivalent, recombinant, PF, 0 5 mL, for patients 18 yr+ (FLUBLOK) (Completed)          Allergic rhinitis   We reviewed that her ongoing symptoms over last 2 months appear to be related to allergies    She was encouraged to try switching from Zyrtec to Guerraview and was also directed to start using her Nasalide nasal Recommended aggressive dry eye treatment. spray on a consistent daily basis  She should call if her symptoms are worsening or failing to improve  Fibromyalgia    Patient notes slight improvement with increasing gabapentin dose  She will continue to follow with Rheumatology as directed  Severe recurrent major depression without psychotic features ([facility] [address] )    Stable  She will continue to follow with Psychiatry as well as her therapist         The patient presented today for health maintenance visit  She currently eats a poor  diet  She has a sporadic  exercise regimen  She was encouraged to improve diet and improve exercise with a goal of at least 150 minutes of exercise weekly  For breast cancer screening, mammogram was ordered  For colorectal cancer screening, patient was referred to GI for colonoscopy  Screening labs were ordered  Immunizations are up-to-date, Tdap (Boostrix/Adacel) given today and influenza vaccine given today  Advice and education were given regarding nutrition   exercise  weight loss  Chief Complaint     Chief Complaint   Patient presents with    Physical Exam       History of Present Illness   Eric Davis Chris Villela is a 47y o -year-old female who presents for health maintenance visit  HM, Adult Female: The patient is being seen for health maintenance evaluation  General Health: The patient's health since the last visit is described as poor - noting poor appetite and motivation with circumstances  She has had no recent dental visits- now has coverage and will go  She confirms vision problems - notes that she needs glasses but notes that she has not seen them recently - now has coverage and will go  She denies hearing loss  Immunizations are not up-to-date, Tdap (Boostrix/Adacel) is due and influenza vaccine is due  Lifestyle:  She describes her diet as a poor and skips breakfast/lunch for the most of the times   She reports a sporadic exercise routine - walks sometimes and tries to be on feet in the house a lot  She denies tobacco use  She confirms rare alcohol use  She denies drug use  Reproductive Health: The patient is s/p total hysterectomy    Screening:  Her most recent cervical cancer screening was about 2006  Her last breast cancer screening was a mammogram on about 2006  She does regular self breast exams  Colorectal cancer screening was never  Metabolic screening includes lipid panel done (will have done today), glucose screening done 4/2018, thyroid function test done 10/2017  Safety Screening:  She confirms wearing seatbelts, confirms having smoke and carbon monoxide detectors, and confirms wearing sunscreen  She denies domestic violence  She notes recent flare with sciatica in the left leg - notes that she sometimes notes improvement with gabapentin and hot shower/heating pad - might be flared for 2 weeks now    The patient states that she is continuing to follow with Rheumatology regularly regarding her fibromyalgia  She has been using gabapentin as prescribed by them (increasesed recently and seems to help) and did not try aqua therapy as was suggested her last visit here  Her sleep has been poor  She had previously noted some improvement with the addition of Abilify by Psychiatry  She feels that overall her anxiety/depression have been poor  She is additionally on Cymbalta , clonazepam, and trazodone via Psychiatry  URI    This is a new problem  Episode onset: chronic for the last 3 months or so  There has been no fever  Associated symptoms include coughing (with postnasal drip) and headaches (intermittently all over - sometimes for days)  Pertinent negatives include no chest pain, diarrhea, dysuria, nausea, rash, sinus pain, sneezing, sore throat (resolved), vomiting or wheezing  Treatments tried: Mucinex  Review of Systems   Review of Systems   Constitutional: Negative for chills and fever     HENT: Negative for sinus pain, sneezing and sore throat (resolved)  Respiratory: Positive for cough (with postnasal drip), chest tightness (occasional) and shortness of breath (occasional)  Negative for wheezing  Cardiovascular: Positive for palpitations (chronic with anxiety)  Negative for chest pain  Gastrointestinal: Negative for diarrhea, nausea and vomiting  Genitourinary: Negative for dysuria and frequency  Musculoskeletal: Positive for myalgias  Skin: Negative for rash  Neurological: Positive for headaches (intermittently all over - sometimes for days)  Negative for syncope  Hematological: Bruises/bleeds easily (chronic)  Psychiatric/Behavioral: Positive for dysphoric mood and sleep disturbance  Negative for suicidal ideas  The patient is nervous/anxious          Active Problem List     Patient Active Problem List   Diagnosis    Allergic rhinitis    Costochondritis    Fibromyalgia    Generalized anxiety disorder    Severe recurrent major depression without psychotic features (HCC)    Menopause    Migraine headache    Other insomnia    Panic disorder without agoraphobia    Chronic posttraumatic stress disorder    Pulmonary nodule    Sacroiliitis ([address] )    Vitamin D deficiency    Extrapyramidal reaction         Past Medical History:  Past Medical History:   Diagnosis Date    Abnormal chest x-ray     last assessed: 2/6/2015    Anxiety     Chronic pain     Costochondritis     Depression     Fibromyalgia     Head injury     Herpetic vulvovaginitis 11/13/2009    Iron deficiency anemia     resolved    PTSD (post-traumatic stress disorder)     Pulmonary nodule     Sacroiliitis ([address] )     Sciatica        Past Surgical History:  Past Surgical History:   Procedure Laterality Date    ABDOMINAL SURGERY      to remove abscess from abdomen after tubal ligation    CHOLECYSTECTOMY      DENTAL SURGERY      GALLBLADDER SURGERY      HYSTERECTOMY      vaginal    SALPINGOOPHORECTOMY      TOTAL ABDOMINAL HYSTERECTOMY  TUBAL LIGATION  1991    with abcjeremie Okeefe  2003       Family History:  Family History   Problem Relation Age of Onset    Alcohol abuse Brother     Drug abuse Brother     Bipolar disorder Daughter     Hypertension Mother     Heart attack Father     Alcohol abuse Father     Hypertension Sister    [de-identified] Migraines Sister     Heart attack Maternal Grandmother     Prostate cancer Maternal Grandfather     Colon polyps Sister        Social History:  Social History     Social History    Marital status: Legally      Spouse name: N/A    Number of children: 2    Years of education: Vocational school for Medical Assistant     Occupational History    Not on file       Social History Main Topics    Smoking status: Never Smoker    Smokeless tobacco: Never Used    Alcohol use Yes      Comment: Social alcohol use: 1 glass of wine on special occasions    Drug use: No    Sexual activity: Not Currently     Other Topics Concern    Not on file     Social History Narrative    Education: high school graduate and vocational school for Medical Assistant    Learning Disabilities: none    Marital History:     Children: 1 adult son, 1 adult daughter    Living Arrangement: lives in home with adult daughter    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, currently unemployed, applied for disability    Functioning Relationships: poor relationship with     Legal History: none     History: None       Objective     Vitals:    10/22/18 1312   BP: 96/72   BP Location: Left arm   Patient Position: Sitting   Cuff Size: Standard   Pulse: 100   Temp: 99 1 °F (37 3 °C)   SpO2: 98%   Weight: 66 5 kg (146 lb 8 oz)   Height: 5' 1 5" (1 562 m)     Wt Readings from Last 3 Encounters:   10/22/18 66 5 kg (146 lb 8 oz)   08/16/18 63 5 kg (140 lb)   06/29/18 64 9 kg (143 lb)       Physical Exam   Constitutional: She appears well-developed and well-nourished  No distress  HENT:   Head: Normocephalic and atraumatic  Right Ear: Hearing, tympanic membrane, external ear and ear canal normal    Left Ear: Hearing, tympanic membrane, external ear and ear canal normal    Nose: Mucosal edema (swollen without erythema) present  No rhinorrhea  Mouth/Throat: Oropharynx is clear and moist and mucous membranes are normal    Eyes: Pupils are equal, round, and reactive to light  Conjunctivae and lids are normal    Neck: Trachea normal and normal range of motion  Neck supple  Carotid bruit is not present  No thyroid mass and no thyromegaly present  Cardiovascular: Normal rate, regular rhythm, S1 normal, S2 normal, normal heart sounds and intact distal pulses  No murmur heard  Pulses:       Radial pulses are 2+ on the right side, and 2+ on the left side  Posterior tibial pulses are 2+ on the right side, and 2+ on the left side  Pulmonary/Chest: Effort normal and breath sounds normal  She has no decreased breath sounds  She has no wheezes  She has no rhonchi  She has no rales  Abdominal: Soft  Normal appearance and bowel sounds are normal  She exhibits no distension and no mass  There is no hepatosplenomegaly  There is no tenderness  No hernia  Musculoskeletal: Normal range of motion  She exhibits no edema  Lymphadenopathy:     She has no cervical adenopathy  Neurological: She is alert  She has normal strength  No sensory deficit (light touch sensation intact and equal in UE and LE bilaterally)  Skin: Skin is warm and dry  No rash noted  Psychiatric: She has a normal mood and affect  Her behavior is normal    Vitals reviewed        Pertinent Laboratory/Diagnostic Studies:  Results for orders placed or performed in visit on 04/23/18   Comprehensive metabolic panel   Result Value Ref Range    Glucose 80 65 - 99 mg/dL    BUN 11 7 - 25 mg/dL    Creatinine 0 79 0 70 - 1 33 mg/dL    eGFR Non  104 > OR = 60 mL/min/1 73m2    SL AMB EGFR  120 > OR = 60 mL/min/1 73m2    SL AMB BUN/CREATININE RATIO NOT APPLICABLE 6 - 22 (calc)    Sodium 137 135 - 146 mmol/L    Potassium 4 0 3 5 - 5 3 mmol/L    Chloride 101 98 - 110 mmol/L    CO2 27 20 - 31 mmol/L    SL AMB CALCIUM 9 4 8 6 - 10 3 mg/dL    SL AMB PROTEIN, TOTAL 7 0 6 1 - 8 1 g/dL    Albumin 4 3 3 6 - 5 1 g/dL    Globulin 2 7 1 9 - 3 7 g/dL (calc)    SL AMB ALBUMIN/GLOBULIN RATIO 1 6 1 0 - 2 5 (calc)    TOTAL BILIRUBIN 0 4 0 2 - 1 2 mg/dL    Alkaline Phosphatase 65 40 - 115 U/L    SL AMB AST 16 10 - 35 U/L    SL AMB ALT 12 9 - 46 U/L   HLA-B27 antigen   Result Value Ref Range    HLA-B27 NEGATIVE NEGATIVE   Sedimentation rate, automated   Result Value Ref Range    SL AMB SED RATE BY MODIFIED WESTERGREN 14 < OR = 20 mm/h   CBC   Result Value Ref Range    White Blood Cell Count 4 9 3 8 - 10 8 Thousand/uL    Red Blood Cell Count 4 52 4 20 - 5 80 Million/uL    Hemoglobin 12 6 (L) 13 2 - 17 1 g/dL    HCT 37 3 (L) 38 5 - 50 0 %    MCV 82 5 80 0 - 100 0 fL    MCH 27 9 27 0 - 33 0 pg    MCHC 33 8 32 0 - 36 0 g/dL    RDW 12 3 11 0 - 15 0 %    Platelet Count 227 526 - 400 Thousand/uL    SL AMB MPV 9 1 7 5 - 12 5 fL   Lyme Antibody Profile with reflex to WB   Result Value Ref Range    SL AMB LYME AB SCREEN <0 90 index   ROMY Screen w/ Reflex to Titer/Pattern   Result Value Ref Range    SL ROMY SCREEN, IFA NEGATIVE NEGATIVE   Cyclic citrul peptide antibody, IgG   Result Value Ref Range    Cyclic Citrullinated Peptide (CCP) Ab (IgG) <16 UNITS   Rheumatoid Factor   Result Value Ref Range    Rheumatoid Factor <14 <14 IU/mL   C-reactive protein   Result Value Ref Range    C-Reactive Protein, Quant 2 5 <8 0 mg/L   Hepatitis B surface antigen   Result Value Ref Range    HBsAg Screen NON-REACTIVE NON-REACTIVE   Hepatitis C Ab W/Refl To HCV RNA, Qn, PCR   Result Value Ref Range    HEP C AB NON-REACTIVE NON-REACTIVE    Signal to Cut-Off 0 85 <1 00   Vitamin D 25 hydroxy   Result Value Ref Range Vitamin D, 25-Hydroxy, Serum 19 (L) 30 - 100 ng/mL   Urinalysis with reflex to microscopic   Result Value Ref Range    Color UA YELLOW YELLOW    Urine Appearance CLEAR CLEAR    Specific Gravity 1 021 1 001 - 1 035    Ph 6 5 5 0 - 8 0    SL AMB GLUCOSE, URINE, QUAL  NEGATIVE NEGATIVE    SL AMB BILIRUBIN, URINE NEGATIVE NEGATIVE    SL AMB KETONE, URINE, QUAL  NEGATIVE NEGATIVE    SL AMB BLOOD, URINE NEGATIVE NEGATIVE    SL AMB PROTEIN, URINE, QUAL  NEGATIVE NEGATIVE    SL AMB NITRITES URINE, QUAL   NEGATIVE NEGATIVE    SL AMB LEUKOCYTE ESTERASE URINE NEGATIVE NEGATIVE       Orders Placed This Encounter   Procedures    Mammo screening bilateral w cad    TDAP VACCINE GREATER THAN OR EQUAL TO 6YO IM    influenza vaccine, [id number], quadrivalent, recombinant, PF, 0 5 mL, for patients 18 yr+ (FLUBLOK)    Ambulatory referral to Gastroenterology       ALLERGIES:  Allergies   Allergen Reactions    Prednisone Shortness Of Breath and Other (See Comments)     Trouble sleeping and sweats    Dust Mite Extract     Pollen Extract     Tree Extract        Current Medications     Current Outpatient Prescriptions   Medication Sig Dispense Refill    acyclovir (ZOVIRAX) 800 mg tablet Take 1 tablet by mouth 2 (two) times a day      ARIPiprazole (ABILIFY) 2 mg tablet Take 1 tablet (2 mg total) by mouth daily at bedtime for 120 days 30 tablet 3    benztropine (COGENTIN) 0 5 mg tablet Take 1 tablet (0 5 mg total) by mouth 2 (two) times a day as needed for tremors for up to 120 days 60 tablet 3    cetirizine (ZYRTEC ALLERGY) 10 mg tablet Take 1 tablet by mouth daily as needed      DULoxetine (CYMBALTA) 60 mg delayed release capsule Take 2 capsules (120 mg total) by mouth daily for 120 days 60 capsule 3    ergocalciferol (ERGOCALCIFEROL) 26888 units capsule Take 50,000 Units by mouth once a week      estradiol (ESTRACE) 2 MG tablet Take 2 mg by mouth daily      flunisolide (NASALIDE) 25 MCG/ACT (0 025%) SOLN Inhale 2 sprays every 12 (twelve) hours 1 Bottle 3    gabapentin (NEURONTIN) 300 mg capsule Take 300 mg by mouth 3 (three) times a day        ibuprofen (MOTRIN) 400 mg tablet Take 400 mg by mouth 3 (three) times a day      Misc Natural Products (COLON CLEANSE) CAPS Take 1 capsule by mouth daily      traZODone (DESYREL) 50 mg tablet Take 1 tablet (50 mg total) by mouth daily at bedtime as needed for sleep for up to 120 days 30 tablet 3    clonazePAM (KlonoPIN) 0 5 mg tablet Take 1 tablet (0 5 mg total) by mouth 3 (three) times a day for 120 days To be filled on or after 6/13/18 90 tablet 3    fexofenadine (ALLEGRA) 180 MG tablet Take 1 tablet (180 mg total) by mouth daily 30 tablet 5    nabumetone (RELAFEN) 750 mg tablet Take 1 tablet (750 mg total) by mouth 2 (two) times a day As needed for sciatic pain  DO NOT TAKE WITH IBUPROFEN  60 tablet 1     No current facility-administered medications for this visit            Health Maintenance     Health Maintenance   Topic Date Due    MAMMOGRAM  1965    CRC Screening: Colonoscopy  1965    DTaP,Tdap,and Td Vaccines (1 - Tdap) 03/07/1998    INFLUENZA VACCINE  07/01/2018     Immunization History   Administered Date(s) Administered    H1N1, All Formulations 11/11/2009    Influenza 12/10/2006    Influenza Quadrivalent, 6-35 Months IM 10/18/2016    Influenza, recombinant, quadrivalent,injectable, preservative free 10/22/2018    Td (adult), adsorbed 03/06/1998    Tdap 10/22/2018       Aurora Toscano PA-C  10/22/2018 8:17 PM  [facility] [address]  [facility]
